# Patient Record
Sex: FEMALE | Race: WHITE | NOT HISPANIC OR LATINO | Employment: FULL TIME | URBAN - METROPOLITAN AREA
[De-identification: names, ages, dates, MRNs, and addresses within clinical notes are randomized per-mention and may not be internally consistent; named-entity substitution may affect disease eponyms.]

---

## 2017-05-01 ENCOUNTER — ALLSCRIPTS OFFICE VISIT (OUTPATIENT)
Dept: OTHER | Facility: OTHER | Age: 52
End: 2017-05-01

## 2017-10-16 ENCOUNTER — ALLSCRIPTS OFFICE VISIT (OUTPATIENT)
Dept: OTHER | Facility: OTHER | Age: 52
End: 2017-10-16

## 2017-10-16 DIAGNOSIS — Z12.31 ENCOUNTER FOR SCREENING MAMMOGRAM FOR MALIGNANT NEOPLASM OF BREAST: ICD-10-CM

## 2017-10-16 DIAGNOSIS — Z01.419 ENCOUNTER FOR GYNECOLOGICAL EXAMINATION WITHOUT ABNORMAL FINDING: ICD-10-CM

## 2017-10-17 NOTE — PROGRESS NOTES
Assessment  1  Encounter for screening mammogram for malignant neoplasm of breast (V76 12)   (Z12 31)   2  Encounter for routine gynecological examination (V72 31) (Z01 419)    Plan  Encounter for routine gynecological examination, Encounter for screening mammogram  for malignant neoplasm of breast    · MAMMO SCREENING BILATERAL W 3D & CAD; Status:Hold For - Scheduling; Requested  for:16Oct2017;    Perform:[facility]; Due:01Mih9176; Ordered;For:Encounter for routine gynecological examination, Encounter for screening mammogram for malignant neoplasm of breast; Ordered By:Saturnino Esparza Hams;   · Follow-up visit in 1 year Evaluation and Treatment  Follow-up  Status: Hold For -  Scheduling  Requested for: 75ZFF2902   Ordered; For: Encounter for routine gynecological examination, Encounter for screening mammogram for malignant neoplasm of breast; Ordered By: Isac Cazares Performed:  Due: 85OPL6912    Discussion/Summary  health maintenance visit healthy adult female Currently, she eats a healthy diet  cervical cancer screening is current Breast cancer screening: mammogram has been ordered  The patient has the current Goals: Patient is at goal    Patient is able to Self-Care  PATIENT EDUCATION RECORD   Indication for Services: Annual gyn exam    Possible side effects of new medications were reviewed with the patient/guardian today  The treatment plan was reviewed with the patient/guardian  The patient/guardian understands and agrees with the treatment plan      Chief Complaint  1  Visit For: Preventive General Multisystem Exam    History of Present Illness  GYN HM, Adult Female [facility]: The patient is being seen for a health maintenance and gynecology evaluation  The last health maintenance visit was 1 year(s) ago  General Health: The patient's health since the last visit is described as good  She has regular dental visits  -- She denies vision problems  -- She denies hearing loss   Immunizations status: up to date    Lifestyle:  She consumes a diverse and healthy diet  -- She does not have any weight concerns  -- She exercises regularly  -- She does not use tobacco -- She denies alcohol use  -- She denies drug use  Reproductive health: the patient is perimenopausal--   she reports no menstrual problems  Screening: cancer screening reviewed and current  metabolic screening reviewed and current  risk screening reviewed and current  Review of Systems    Constitutional: No fever, no chills, feels well, no tiredness, no recent weight gain or loss  ENT: no ear ache, no loss of hearing, no nosebleeds or nasal discharge, no sore throat or hoarseness  Cardiovascular: no complaints of slow or fast heart rate, no chest pain, no palpitations, no leg claudication or lower extremity edema  Respiratory: no complaints of shortness of breath, no wheezing, no dyspnea on exertion, no orthopnea or PND  Breasts: no complaints of breast pain, breast lump or nipple discharge  Gastrointestinal: no complaints of abdominal pain, no constipation, no nausea or diarrhea, no vomiting, no bloody stools  Genitourinary: no complaints of dysuria, no incontinence, no pelvic pain, no dysmenorrhea, no vaginal discharge or abnormal vaginal bleeding  Musculoskeletal: no complaints of arthralgia, no myalgia, no joint swelling or stiffness, no limb pain or swelling  Integumentary: no complaints of skin rash or lesion, no itching or dry skin, no skin wounds  Neurological: no complaints of headache, no confusion, no numbness or tingling, no dizziness or fainting  Active Problems  1  History of Breast self examination education, encounter for (V65 49) (Z71 89)   2  Dense breast tissue (793 82) (R92 2)   3  Dizziness (780 4) (R42)   4  Encounter for PPD test (V74 1) (Z11 1)   5  Excessive cerumen in left ear canal (380 4) (H61 22)   6  History of self breast exam   7  Impacted cerumen of left ear (380 4) (H61 22)   8   Lumbago (724 2) (M54 5)   9  Paronychia, finger (681 02) (L03 019)   10  Skin lesion of face (709 9) (L98 9)   11  Toenail deformity (703 9) (L60 8)    Past Medical History   · History of Acute upper respiratory infection (465 9) (J06 9)   · History of Breast self examination education, encounter for (V65 49) (Z71 89)   · History of Colon cancer screening (V76 51) (Z12 11)   · History of Contusion of skin with intact surface (924 9) (T14 8XXA)   · History of Encounter for screening for malignant neoplasm of cervix (V76 2) (Z12 4)   · History of Encounter for screening for malignant neoplasm of cervix (V76 2) (Z12 4)   · History of  2 (V22 2) (Z33 1)   · History of influenza vaccination (V49 89) (Z92 29)   · History of self breast exam   · History of Pain in right foot (729 5) (M79 671)   · History of Screening for HPV (human papillomavirus) (V73 81) (Z11 51)   · History of Sore throat (462) (J02 9)    Surgical History   · History of Bx Breast Percutan Needle Core Use Imag Guide (Stereotactic)   · History of Hernia Repair    Family History  Mother    · Family history of Breast Cancer (V16 3)  Father    · Family history of Prostate Cancer (V16 42)    Social History   · Denied: History of Alcohol Use (History)   · Birth Control Method - Partner Had Vasectomy   · Denied: History of Caffeine Use   · Denied: History of Drug Use   · Marital History - Currently    · Never A Smoker   · Sikhism Affiliation [employer]   · Two children   · Uses Safety Equipment - Seatbelts    Current Meds   1  Daily Multiple Vitamins Oral Tablet; Therapy: (Recorded:70Egp5923) to Recorded   2  Fish Oil CAPS; Therapy: (Recorded:00Hdf2604) to Recorded    Allergies  1  No Known Drug Allergies  2   Adhesive Tape    Vitals   Recorded: 56RCI3293 06:93FG   Systolic 918, LUE, Sitting   Diastolic 62, LUE, Sitting   Height 5 ft 5 in   Weight 121 lb    BMI Calculated 20 14   BSA Calculated 1 6   LMP [id number]     Physical Exam    Constitutional General appearance: No acute distress, well appearing and well nourished  Neck   Neck: Normal, supple, trachea midline, no masses  Thyroid: Normal, no thyromegaly  Genitourinary   External genitalia: Normal and no lesions appreciated  Vagina: Normal, no lesions or dryness appreciated  Urethra: Normal     Urethral meatus: Normal     Bladder: Normal, soft, non-tender and no prolapse or masses appreciated  Cervix: Normal, no palpable masses  Examination of the cervix revealed normal findings  A Pap smear was not performed  Bimanual exam findings include a patent cervical os  Uterus: Normal, non-tender, not enlarged, and no palpable masses  Adnexa/parametria: Normal, non-tender and no fullness or masses appreciated  Anus, perineum, and rectum: Normal sphincter tone, no masses, and no prolapse  Chest   Breasts: Normal and no dimpling or skin changes noted  Abdomen   Abdomen: Normal, non-tender, and no organomegaly noted  Liver and spleen: No hepatomegaly or splenomegaly  Examination for hernias: No hernias appreciated  Lymphatic   Palpation of lymph nodes in neck, axillae, groin and/or other locations: No lymphadenopathy or masses noted  Skin   Skin and subcutaneous tissue: Normal skin turgor and no rashes  Palpation of skin and subcutaneous tissue: Normal     Psychiatric   Orientation to person, place, and time: Normal     Mood and affect: Normal        Signatures   Electronically signed by :  Mercy Snow MD; Oct 16 2017  2:15PM EST                       (Author)

## 2017-12-05 ENCOUNTER — HOSPITAL ENCOUNTER (OUTPATIENT)
Dept: MAMMOGRAPHY | Facility: MEDICAL CENTER | Age: 52
Discharge: HOME | End: 2017-12-05
Payer: COMMERCIAL

## 2017-12-05 DIAGNOSIS — Z12.31 ENCOUNTER FOR SCREENING MAMMOGRAM FOR MALIGNANT NEOPLASM OF BREAST: ICD-10-CM

## 2017-12-05 DIAGNOSIS — Z01.419 ENCOUNTER FOR GYNECOLOGICAL EXAMINATION WITHOUT ABNORMAL FINDING: ICD-10-CM

## 2017-12-05 PROCEDURE — G0202 SCR MAMMO BI INCL CAD: HCPCS

## 2017-12-05 PROCEDURE — 77063 BREAST TOMOSYNTHESIS BI: CPT

## 2017-12-17 ENCOUNTER — ALLSCRIPTS OFFICE VISIT (OUTPATIENT)
Dept: OTHER | Facility: OTHER | Age: 52
End: 2017-12-17

## 2017-12-19 NOTE — PROGRESS NOTES
Assessment  1  Blepharitis (373 00) (H01 009)    Plan  Blepharitis    · Doxycycline Hyclate 100 MG Oral Tablet; Take 1 tablet twice daily   · Mometasone Furoate 0 1 % External Cream; Apply sparingly to upper eyelid oncea day  Colon cancer screening    · *1 -  [facility] Physician Referral  *  Status: Active -Retrospective By Protocol Authorization  Requested for: [id number]  Care Summary provided  : Yes  PMH: Colon cancer screening    · COLONOSCOPY; Status:Active - Retrospective By Protocol Authorization; Requestedfor:15Xap1930;     Discussion/Summary    Patient is a 73-year-old female with erythema and swelling of her right upper eyelid  I prescribed a topical cortisone cream and reminded her to use it very sparingly  I also prescribed an antibiotic for 7 days in case there is a stye forming  I also advised her not to use eye makeup for the next 5 days  Possible side effects of new medications were reviewed with the patient/guardian today  The treatment plan was reviewed with the patient/guardian  The patient/guardian understands and agrees with the treatment plan      Chief Complaint  Pt presents with c/o of swollen R eye lid x 3 days  Pt states she feels discomfort and itchy  Pt has applied ice pack and Benadryl ointment with some relief at that moment  Pt is due for Colonoscopy, order given to pt  Pt is UTD with Pap Smear and Mammogram       History of Present Illness  HPI: Patient is here with swelling of right upper eyelid  She has had this about 3 days  It has not changed  No pus  No cold symptoms  Review of Systems   Constitutional: no fever-- and-- no chills  ENT: no ear ache, no loss of hearing, no nosebleeds or nasal discharge, no sore throat or hoarseness  Respiratory: no complaints of shortness of breath, no wheezing, no dyspnea on exertion, no orthopnea or PND  Active Problems  1  History of Breast self examination education, encounter for (V60 72) (R72 96)   2  Dizziness (780 4) (R42)   3  Encounter for routine gynecological examination (V72 31) (Z01 419)   4  Encounter for screening mammogram for malignant neoplasm of breast (V76 12) (Z12 31)   5  Excessive cerumen in left ear canal (380 4) (H61 22)   6  History of self breast exam   7  Lumbago (724 2) (M54 5)    Past Medical History  1  History of Acute upper respiratory infection (465 9) (J06 9)   2  History of Breast self examination education, encounter for (V65 49) (Z71 89)   3  History of Colon cancer screening (V76 51) (Z12 11)   4  History of Contusion of skin with intact surface (924 9) (T14 8XXA)   5  History of Dense breast tissue (793 82) (R92 2)   6  History of Encounter for PPD test (V74 1) (Z11 1)   7  History of Encounter for screening for malignant neoplasm of cervix (V76 2) (Z12 4)   8  History of Encounter for screening for malignant neoplasm of cervix (V76 2) (Z12 4)   9  History of  2 (V22 2) (Z33 1)   10  History of influenza vaccination (V49 89) (Z92 29)   11  History of paronychia of finger (V13 3) (Z87 2)   12  History of self breast exam   13  History of Impacted cerumen of left ear (380 4) (H61 22)   14  History of Pain in right foot (729 5) (M79 671)   15  History of Screening for HPV (human papillomavirus) (V73 81) (Z11 51)   16  History of Skin lesion of face (709 9) (L98 9)   17  History of Sore throat (462) (J02 9)   18  History of Toenail deformity (703 9) (L60 8)  Active Problems And Past Medical History Reviewed: The active problems and past medical history were reviewed and updated today  Family History  Mother    1  Family history of Breast Cancer (V16 3)  Father    2   Family history of Prostate Cancer (V16 42)    Social History   · Denied: History of Alcohol Use (History)   · Birth Control Method - Partner Had Vasectomy   · Denied: History of Caffeine Use   · Denied: History of Drug Use   · Marital History - Currently    · Never A Smoker   · Christianity Affiliation [employer]   · Two children   · Uses Safety Equipment - Seatbelts  The social history was reviewed and updated today  The social history was reviewed and is unchanged  Surgical History  1  History of Bx Breast Percutan Needle Core Use Imag Guide (Stereotactic)   2  History of Hernia Repair    Current Meds   1  Cyclobenzaprine HCl - 5 MG Oral Tablet; TAKE 1 TABLET AT BEDTIME AS NEEDED; Therapy: 46WZN1583 to (Evaluate:12Jan2018)  Requested for: 24Hpa1806; Last Rx:[id number] Ordered   2  Daily Multiple Vitamins Oral Tablet; Therapy: (Recorded:16Oct2017) to Recorded   3  Fish Oil CAPS; Therapy: ([employer]) to Recorded   4  Meloxicam 15 MG Oral Tablet; take 1 tablet daily as needed; Therapy: 16QCP5121 to (Evaluate:12Jan2018)  Requested for: 48Gbf3816; Last Rx:82Yjm1433 Ordered    The medication list was reviewed and updated today  Allergies  1  No Known Drug Allergies  2  Adhesive Tape    Vitals   Recorded: [id number] 10:38AM   Temperature 97 5 F, Tympanic   Heart Rate 69   Pulse Quality Normal   Respiration Quality Normal   Respiration 18   Systolic 149, LUE, Sitting   Diastolic 70, LUE, Sitting   BP CUFF SIZE Large   Height 5 ft 5 in   Weight 117 lb 6 oz   BMI Calculated 19 53   BSA Calculated 1 58   O2 Saturation 97, RA   LMP 90NMM0212   Pain Scale discomfort     Physical Exam   Constitutional  General appearance: No acute distress, well appearing and well nourished  Eyes  Conjunctiva and lids: Abnormal  -- Right upper lid with erythema and also a small white round area  Pupils and irises: Equal, round and reactive to light  Ears, Nose, Mouth, and Throat  External inspection of ears and nose: Normal    Otoscopic examination: Tympanic membranes translucent with normal light reflex  Canals patent without erythema  Oropharynx: Normal with no erythema, edema, exudate or lesions           Signatures   Electronically signed by : Rahel Carl DO; Dec 18 2017  8:02AM EST (Author)

## 2018-01-13 VITALS
HEART RATE: 63 BPM | DIASTOLIC BLOOD PRESSURE: 68 MMHG | WEIGHT: 120 LBS | TEMPERATURE: 96.4 F | OXYGEN SATURATION: 98 % | SYSTOLIC BLOOD PRESSURE: 104 MMHG | BODY MASS INDEX: 19.29 KG/M2 | HEIGHT: 66 IN | RESPIRATION RATE: 16 BRPM

## 2018-01-15 VITALS
DIASTOLIC BLOOD PRESSURE: 62 MMHG | SYSTOLIC BLOOD PRESSURE: 106 MMHG | BODY MASS INDEX: 20.16 KG/M2 | HEIGHT: 65 IN | WEIGHT: 121 LBS

## 2018-01-15 NOTE — RESULT NOTES
Verified Results  (Q) CULTURE, FUNGUS, W/SMEAR HAIR, SKIN, NAIL 21Cvj2158 12:00AM [facility]     Test Name Result Flag Reference   CULTURE, FUNGUS, W/SMEAR$HAIR, SKIN, NAIL      CULTURE,FUNGUS,SKIN,HAIR, NAIL W/DIRECT FLUOR/KOH         MICRO NUMBER:      [id number]    TEST STATUS:       FINAL    SPECIMEN SOURCE:   NOT GIVEN    SPECIMEN QUALITY:  ADEQUATE    SMEAR:             No fungal elements seen      RESULT:            No fungus isolated

## 2018-01-16 NOTE — PROGRESS NOTES
Assessment    1  Encounter for preventive health examination (V70 0) (Z00 00)   2  Skin lesion of face (709 9) (L98 9)   3  Toenail deformity (703 9) (L60 8)   4  Impacted cerumen of left ear (380 4) (H61 22)   5  Encounter for PPD test (V74 1) (Z11 1)    Plan  Encounter for PPD test    · PPD; inject 0 1mL intradermal; To Be Done: [id number]  Toenail deformity    · (Q) CULTURE, FUNGUS, W/SMEAR HAIR, SKIN, NAIL; Status:Active; Requested  for:[id number];    · Dermatology Referral Other Physician Referral  Consult; [facility]  [phone] Lisa Iyer    [phone]  Status: Hold For - Scheduling  Requested for: [id number]  are Referring to a non- Preferred Provider : Scheduling access issues  Care Summary provided  : Yes    Discussion/Summary  health maintenance visit Currently, she eats a healthy diet and has an adequate exercise regimen  cervical cancer screening is current Pt follows with Dr Carlos Clancy, has appt Oct 2016 for yearly Breast cancer screening: mammogram is current and last done nov 2015  Colorectal cancer screening: the risks and benefits of colorectal cancer screening were discussed and recommended colonoscopy screening - pt states she has discussed this with her GYN and would consider possibly at 55, refuses my recommendations today  Screening lab work includes discussed recommendations for screening BW - pt refuses today  The refuses flu vaccine today  PPD administered as needed for employment  will return within 48-72 hours for PPD read  She was advised to be evaluated by an ophthalmologist and a dentist  Advice and education were given regarding nutrition, aerobic exercise, reproductive health and cardiovascular risk reduction  Patient discussion: discussed with the patient  1) cerumen impaction on left - advised debrox drops OTC x a few days   will schedule ear lavage with TZ sunday when she returns for PPD read - ok to schedule today upon check out - TZ aware    2) skin lesion face - based on location, new lesion and irregularity, advised consultation with derm  pt given contact info for [facility] and will call for appt  3) toenail deformity: left great toenail present for several months  appears to be fungal, no known nail trauma  sample taken to confirm diagnosis and will discuss treatment if necessary  I did have discussion with pt about treatment as she states she has used OTC antifungal for toenails x 1 month and didn't help  I explained even prescription medications may take several months to work and are not even guaranteed  will call pt with results and discuss treatment options if necessary  Chief Complaint  pt here today for a work physical and needs forms filled      History of Present Illness  HM, Adult Female: The patient is being seen for a health maintenance evaluation  General Health: The patient's health since the last visit is described as good   Denies changes to health, no recent hospitalizations  She has regular dental visits  She denies vision problems  Vision care includes wearing glasses, wearing soft contact lenses and last eye exam: 2016  She denies hearing loss  Immunizations status:  pt refuses flu vaccine  Lifestyle:  She consumes a diverse and healthy diet  She does not have any weight concerns  She exercises regularly  She exercises 3-4 times per week  Exercise includes walking, stretching/yoga/pilates and rides horses  She does not use tobacco  She denies alcohol use  She denies drug use  Reproductive health:  she reports normal menses  Menstrual history: LMP: the last menstrual period was 9/20/16  Recent menstrual periods: bleeding has been normal  The cycles have been regular  The duration of her recent periods has been regular  she uses no contraception  For contraception, she has a partner with a vasectomy  she is sexually active  She is monogamous with a male partner and with   pregnancy history: G 2P 22, 2  Screening: Cervical cancer screening includes a pap smear performed 9/2015  Breast cancer screening includes a mammogram performed nov 2015  Colorectal cancer screening includes no previous colonoscopy  Additional History:  - pt states left great toenail for past few months thickened, darker, scaling and breaking  has tried 1 month of OTC topical antifungal and not helping      - darkened skin lesion left cheek noticed over summer, lightening with topical aloe  Review of Systems    Constitutional: No fever, no chills, feels well, no tiredness, no recent weight gain or weight loss  Eyes: No complaints of eye pain, no red eyes, no eyesight problems, no discharge, no dry eyes, no itching of eyes  ENT: no complaints of earache, no loss of hearing, no nose bleeds, no nasal discharge, no sore throat, no hoarseness  Cardiovascular: No complaints of slow heart rate, no fast heart rate, no chest pain, no palpitations, no leg claudication, no lower extremity edema  Respiratory: No complaints of shortness of breath, no wheezing, no cough, no SOB on exertion, no orthopnea, no PND  Gastrointestinal: No complaints of abdominal pain, no constipation, no nausea or vomiting, no diarrhea, no bloody stools  Genitourinary: No complaints of dysuria, no incontinence, no pelvic pain, no dysmenorrhea, no vaginal discharge or bleeding  Musculoskeletal: No complaints of arthralgias, no myalgias, no joint swelling or stiffness, no limb pain or swelling  Integumentary: as noted in HPI  Neurological: No complaints of headache, no confusion, no convulsions, no numbness, no dizziness or fainting, no tingling, no limb weakness, no difficulty walking  Psychiatric: Not suicidal, no sleep disturbance, no anxiety or depression, no change in personality, no emotional problems  Endocrine: No complaints of proptosis, no hot flashes, no muscle weakness, no deepening of the voice, no feelings of weakness  Hematologic/Lymphatic: No complaints of swollen glands, no swollen glands in the neck, does not bleed easily, does not bruise easily  Active Problems    1  History of Breast self examination education, encounter for (V65 49) (Z71 89)   2  Encounter for screening for malignant neoplasm of cervix (V76 2) (Z12 4)   3  Encounter for screening for malignant neoplasm of cervix (V76 2) (Z12 4)   4  Encounter for screening mammogram for malignant neoplasm of breast (V76 12)   (Z12 31)   5  History of self breast exam   6  Paronychia, finger (681 02) (L03 019)   7  Screening for HPV (human papillomavirus) (V73 81) (Z11 51)    Past Medical History    · History of Acute upper respiratory infection (465 9) (J06 9)   · History of Breast self examination education, encounter for (V65 49) (Z71 89)   · History of Contusion of skin with intact surface (924 9) (T14 8)   · History of Encounter for routine gynecological examination (V72 31) (Z01 419)   · History of  2 (V22 2) (Z33 1)   · History of Pain in right foot (729 5) (M79 671)   · History of Sore throat (462) (J02 9)    Surgical History    · History of Bx Breast Percutan Needle Core Use Imag Guide (Stereotactic)   · History of Hernia Repair    Family History  Mother    · Family history of Breast Cancer (V16 3)  Father    · Family history of Prostate Cancer (V16 42)    Social History    · Denied: History of Alcohol Use (History)   · Birth Control Method - Partner Had Vasectomy   · Denied: History of Caffeine Use   · Denied: History of Drug Use   · Marital History - Currently    · Never A Smoker   · [employer]ism Affiliation [employer]   · Two children   · Uses Safety Equipment - Seatbelts    Current Meds   1  Fish Oil CAPS; Therapy: (Recorded:83Msn5266) to Recorded    Allergies    1  No Known Drug Allergies    2   Adhesive Tape    Vitals   Recorded: 59FEN8209 74:46FF   Systolic 98   Diastolic 52   Heart Rate 64   Respiration 16   Temperature 98 6 F   O2 Saturation 98   Height 5 ft 6 in   Weight 120 lb    BMI Calculated 19 37   BSA Calculated 1 62     Physical Exam    Constitutional   General appearance: No acute distress, well appearing and well nourished  appears healthy, within normal limits of ideal weight and appearance reflects stated age  vitals reviewed  Eyes   Conjunctiva and lids: No swelling, erythema or discharge  Pupils and irises: Equal, round, reactive to light  EOMI, PERRLA B/L  Ears, Nose, Mouth, and Throat   External inspection of ears and nose: Normal     Otoscopic examination: Abnormal   right TM normal  left TM not visible secondary to cerumen impaction  Hearing: Normal   grossly normal B/L at 10ft  Nasal mucosa, septum, and turbinates: Normal without edema or erythema  Lips, teeth, and gums: Normal, good dentition  Oropharynx: Normal with no erythema, edema, exudate or lesions  Neck   Thyroid: Normal, no thyromegaly  Pulmonary   Respiratory effort: No increased work of breathing or signs of respiratory distress  Auscultation of lungs: Clear to auscultation  Cardiovascular   Auscultation of heart: Normal rate and rhythm, normal S1 and S2, no murmurs  Carotid pulses: 2+ bilaterally  right 2+, no bruit heard over the right carotid, left 2+ and no bruit heard over the left carotid  Pedal pulses: 2+ bilaterally  Examination of extremities for edema and/or varicosities: Normal     Abdomen   Abdomen: Non-tender, no masses  The abdomen was flat  Bowel sounds were normal  The abdomen was soft and nontender  Lymphatic   Palpation of lymph nodes in neck: No lymphadenopathy  Palpation of lymph nodes in groin: No lymphadenopathy  Musculoskeletal   Gait and station: Normal     Digits and nails: Normal without clubbing or cyanosis      Joints, bones, and muscles: Normal     Range of motion: Normal     Stability: Normal     Muscle strength/tone: Normal     Skin   Skin and subcutaneous tissue: Abnormal   left great toenail appears darker in color/yellow, slightly thickened along lateral corner, chipping and appears to be peeling in layers  sample taken for fungal culture  Examination of the skin for lesions: Abnormal   slight hyperpigmented flat but irregularly bordered skin lesion left medial cheek, flat but slightly scaly/dry texture  Neurologic   Cranial nerves: Cranial nerves II-XII intact  Reflexes: 2+ and symmetric  Deep tendon reflexes: 1+ right brachioradialis, 1+ left brachioradialis, 1+ right patella and 1+ left patella  Coordination: Normal finger to nose and heel to shin      Psychiatric   Judgment and insight: Normal     Orientation to person, place, and time: Normal     Mood and affect: Normal        Future Appointments    Date/Time Provider Specialty Site   10/04/2016 01:30 PM Simon Gautam MD Obstetrics/Gynecology [facility]   09/25/2016 09:00 AM Jono Downs [facility] [address]   Electronically signed by : Zoey Claros [facility]; Sep 22 2016  3:25PM EST                       (Author)    Electronically signed by : Lasha Weber DO; Sep 22 2016  3:31PM EST                       (Author)

## 2018-01-17 NOTE — PROCEDURES
Chief Complaint  Pt presents for bilateral ear cerumen removal and a PPD reading  PPD is negative at 2mm  Pt states she has PE forms that are in office which requires signature  Current Meds   1  Fish Oil CAPS; Therapy: (Recorded:[id number]) to Recorded    Allergies    1  No Known Drug Allergies    2  Adhesive Tape    Vitals  Signs    Systolic: 98, LUE, Sitting  Diastolic: 62, LUE, Sitting  Heart Rate: 58  Pulse Quality: Norm  Respiration Quality: Norm  Respiration: 16  Temperature: 98 1 F, Tympanic  Pain Scale: 0  LMP: 14-Sep-2016  O2 Saturation: 98  Height: 5 ft 5 98 in  Weight: 119 lb 2 08 oz  BMI Calculated: 19 24  BSA Calculated: 1 61    Procedure    Procedure: cerumen removal    Indication: tympanic membrane(s) could not be visualized and cerumen impaction in the left ear  Prep: hydrogen peroxide was placed in the canal prior to the procedure  Procedure Note: The procedure was performed by the Provider and lasted 10 minute(s)   The procedure required significant time and effort to remove the cerumen  A otoscope was placed in the ear canal(s) to visualize the ear canal debris  The ear was cleaned by using warm water irrigation  The procedure was successful  Post-Procedure:   Patient Status: the patient tolerated the procedure well  Complications: there were no complications  Patient instructions: dry ear precautions and avoid using q-tips  Follow-up as needed  Assessment    1  Excessive cerumen in left ear canal (380 4) (H61 22)    Plan  Excessive cerumen in left ear canal    · Removal Impacted Cerumen Using Irrigation / Lavage one or both ears - POC;  Status:Complete;   Done: 38VYP7730 09:28AM  Toenail deformity    · Dermatology Referral Other Physician Referral  Consult; [facility]  [phone]    [phone]  Status: Active  Requested for: [id number]  are Referring to a non- Preferred Provider : Scheduling access issues  Care Summary provided   Bhavik Fleming Yes    Future Appointments    Date/Time Provider Specialty Site   10/04/2016 01:30 PM Ny Mcfadden MD Obstetrics/Gynecology [facility]     Signatures   Electronically signed by : Kathe Lazo, [facility]; Sep 25 2016 10:26AM EST                       (Author)    Electronically signed by :  Rebekah Nunez DO; Sep 25 2016  1:57PM EST                       (Author)

## 2018-01-22 VITALS
SYSTOLIC BLOOD PRESSURE: 102 MMHG | BODY MASS INDEX: 19.56 KG/M2 | WEIGHT: 117.38 LBS | HEIGHT: 65 IN | HEART RATE: 69 BPM | TEMPERATURE: 97.5 F | OXYGEN SATURATION: 97 % | RESPIRATION RATE: 18 BRPM | DIASTOLIC BLOOD PRESSURE: 70 MMHG

## 2018-06-05 ENCOUNTER — TRANSCRIBE ORDERS (OUTPATIENT)
Dept: FAMILY MEDICINE CLINIC | Facility: CLINIC | Age: 53
End: 2018-06-05

## 2018-06-05 DIAGNOSIS — H01.006 BLEPHARITIS OF BOTH EYES, UNSPECIFIED EYELID, UNSPECIFIED TYPE: Primary | ICD-10-CM

## 2018-06-05 DIAGNOSIS — H01.003 BLEPHARITIS OF BOTH EYES, UNSPECIFIED EYELID, UNSPECIFIED TYPE: Primary | ICD-10-CM

## 2018-10-23 RX ORDER — DOXYCYCLINE HYCLATE 100 MG
1 TABLET ORAL 2 TIMES DAILY
COMMUNITY
Start: 2017-12-17 | End: 2020-12-13

## 2018-10-23 RX ORDER — MOMETASONE FUROATE 1 MG/G
CREAM TOPICAL
COMMUNITY
Start: 2017-12-17 | End: 2019-06-18 | Stop reason: SDUPTHER

## 2018-10-23 RX ORDER — MELOXICAM 15 MG/1
1 TABLET ORAL DAILY PRN
COMMUNITY
Start: 2017-05-01 | End: 2020-12-13

## 2018-10-23 RX ORDER — CYCLOBENZAPRINE HCL 5 MG
1 TABLET ORAL
COMMUNITY
Start: 2017-05-01 | End: 2020-12-13

## 2018-10-25 ENCOUNTER — ANNUAL EXAM (OUTPATIENT)
Dept: OBGYN CLINIC | Facility: CLINIC | Age: 53
End: 2018-10-25
Payer: COMMERCIAL

## 2018-10-25 VITALS
DIASTOLIC BLOOD PRESSURE: 68 MMHG | SYSTOLIC BLOOD PRESSURE: 116 MMHG | WEIGHT: 122 LBS | HEIGHT: 66 IN | BODY MASS INDEX: 19.61 KG/M2

## 2018-10-25 DIAGNOSIS — Z01.419 ENCOUNTER FOR GYNECOLOGICAL EXAMINATION WITHOUT ABNORMAL FINDING: ICD-10-CM

## 2018-10-25 DIAGNOSIS — Z12.4 SCREENING FOR CERVICAL CANCER: Primary | ICD-10-CM

## 2018-10-25 DIAGNOSIS — Z11.51 SCREENING FOR HUMAN PAPILLOMAVIRUS (HPV): ICD-10-CM

## 2018-10-25 DIAGNOSIS — Z12.31 ENCOUNTER FOR SCREENING MAMMOGRAM FOR MALIGNANT NEOPLASM OF BREAST: ICD-10-CM

## 2018-10-25 PROBLEM — H01.001 BLEPHARITIS OF RIGHT UPPER EYELID: Status: ACTIVE | Noted: 2017-12-17

## 2018-10-25 PROBLEM — M54.50 LOW BACK PAIN: Status: ACTIVE | Noted: 2017-05-01

## 2018-10-25 PROBLEM — H01.009 BLEPHARITIS: Status: ACTIVE | Noted: 2017-12-17

## 2018-10-25 PROBLEM — R42 DIZZINESS: Status: ACTIVE | Noted: 2017-06-19

## 2018-10-25 PROCEDURE — 99396 PREV VISIT EST AGE 40-64: CPT | Performed by: OBSTETRICS & GYNECOLOGY

## 2018-10-25 PROCEDURE — 87624 HPV HI-RISK TYP POOLED RSLT: CPT | Performed by: OBSTETRICS & GYNECOLOGY

## 2018-10-25 PROCEDURE — G0145 SCR C/V CYTO,THINLAYER,RESCR: HCPCS | Performed by: OBSTETRICS & GYNECOLOGY

## 2018-10-25 NOTE — PROGRESS NOTES
CC:  Annual exam    HPI: Popeye Aguirre presents for annual gyn exam   She is doing well and offers no complaints or concerns  She does note that her cycles are starting to spread out and become lighter  She was reassured that this is totally normal given her age  Past Medical History:  Past Medical History:   Diagnosis Date    Endometriosis        Past Surgical History:  History reviewed  No pertinent surgical history  Past OB/Gyn History:  Menstrual cycles as discussed under HPI  Denies any history of sexually transmitted infection  No history of abnormal pap smears  Her last pap smear was 2015  ALLERGIES:   Allergies   Allergen Reactions    Adhesive  [Medical Tape]        MEDS:   Current Outpatient Prescriptions:     DAILY MULTIPLE VITAMINS/IRON PO    Omega-3 Fatty Acids (FISH OIL) 645 MG CAPS    cyclobenzaprine (FLEXERIL) 5 mg tablet    doxycycline hyclate (VIBRA-TABS) 100 mg tablet    meloxicam (MOBIC) 15 mg tablet    mometasone (ELOCON) 0 1 % cream    Family History:  History reviewed  No pertinent family history  Social History:  Social History     Social History    Marital status: /Civil Union     Spouse name: N/A    Number of children: N/A    Years of education: N/A     Occupational History    Not on file  Social History Main Topics    Smoking status: Never Smoker    Smokeless tobacco: Never Used    Alcohol use No    Drug use: No    Sexual activity: Yes     Partners: Male     Other Topics Concern    Not on file     Social History Narrative    No narrative on file         Review of Systems:  Gen:   Denies fatigue, chills, nausea, vomiting, fever  Skin: No rashes or discolorations of any concern  RESP: Denies SOB, no cough  CV: Denies chest pain or palpitations  Breasts: Denies masses, pain, skin changes and nipple discharge  GI: Denies abdominal pain, heartburn, nausea, vomiting, changes in bowel habits     : Denies dysuria, frequency, CVA tenderness, incontinence and hematuria  Genitalia: Denies abnormal vaginal discharge, external lesions, rashes, pelvic pain, pressure, abnormal bleeding  Rectal:  Denies pain, bleeding, hemorrhoids,    Physical Exam:  /68 (BP Location: Left arm, Patient Position: Sitting, Cuff Size: Standard)   Ht 5' 6" (1 676 m)   Wt 55 3 kg (122 lb)   LMP 09/26/2018 (Approximate)   Breastfeeding? No   BMI 19 69 kg/m²    Gen: The patient was alert and oriented x3, pleasant well-appearing female in no acute distress  Neck:  Unremarkable, no anterior or posterior lymphadenopathy, no thyromegaly  Breasts: Symmetric  No dominant, discrete, fixed  or suspicious masses are noted  No skin or nipple changes  No palpable axillary nodes  Abd:  Soft, nontender, nondistended, no masses or organomegaly  Back:  No CVA tenderness, no tenderness to palpation along spine  Pelvic  Normal appearing external female genitalia, no visible lesions, no rashes  Vagina is free of discharge, normal vaginal epithelium, no abnormal  lesions, no evidence of prolapse anteriorly or posteriorly  Normal appearing cervix, mobile and nontender  A thin prep pap smear was obtained  Uterus is normal size, mobile and, nontender  No palpable adnexal masses or tenderness  No anoperineal lesions  Rectal:  No masses, tenderness, hemorrhoids, or obvious blood  Skin:  No concerning lesions  Extremeties: No edema      Assessment & Plan:   1  Routine annual exam      RTO one year orPRN  2  Encounter for screening mammogram, referral for mammogram given to patient

## 2018-10-29 LAB
HPV HR 12 DNA CVX QL NAA+PROBE: NEGATIVE
HPV16 DNA CVX QL NAA+PROBE: NEGATIVE
HPV18 DNA CVX QL NAA+PROBE: NEGATIVE

## 2018-10-31 LAB
LAB AP GYN PRIMARY INTERPRETATION: NORMAL
Lab: NORMAL

## 2018-12-13 ENCOUNTER — HOSPITAL ENCOUNTER (OUTPATIENT)
Dept: MAMMOGRAPHY | Facility: MEDICAL CENTER | Age: 53
Discharge: HOME | End: 2018-12-13
Payer: COMMERCIAL

## 2018-12-13 VITALS — WEIGHT: 122 LBS | HEIGHT: 66 IN | BODY MASS INDEX: 19.61 KG/M2

## 2018-12-13 DIAGNOSIS — Z12.31 ENCOUNTER FOR SCREENING MAMMOGRAM FOR MALIGNANT NEOPLASM OF BREAST: ICD-10-CM

## 2018-12-13 PROCEDURE — 77067 SCR MAMMO BI INCL CAD: CPT

## 2018-12-13 PROCEDURE — 77063 BREAST TOMOSYNTHESIS BI: CPT

## 2018-12-26 ENCOUNTER — OFFICE VISIT (OUTPATIENT)
Dept: FAMILY MEDICINE CLINIC | Facility: CLINIC | Age: 53
End: 2018-12-26
Payer: COMMERCIAL

## 2018-12-26 VITALS
WEIGHT: 120.8 LBS | SYSTOLIC BLOOD PRESSURE: 120 MMHG | TEMPERATURE: 98.4 F | HEART RATE: 75 BPM | DIASTOLIC BLOOD PRESSURE: 68 MMHG | OXYGEN SATURATION: 99 % | RESPIRATION RATE: 16 BRPM | BODY MASS INDEX: 19.41 KG/M2 | HEIGHT: 66 IN

## 2018-12-26 DIAGNOSIS — Z01.84 IMMUNITY STATUS TESTING: ICD-10-CM

## 2018-12-26 DIAGNOSIS — Z23 VACCINE FOR DIPHTHERIA-TETANUS: ICD-10-CM

## 2018-12-26 DIAGNOSIS — Z11.1 SCREENING FOR TUBERCULOSIS: ICD-10-CM

## 2018-12-26 DIAGNOSIS — Z02.89 ENCOUNTER FOR PHYSICAL EXAMINATION RELATED TO EMPLOYMENT: Primary | ICD-10-CM

## 2018-12-26 PROCEDURE — 99214 OFFICE O/P EST MOD 30 MIN: CPT | Performed by: NURSE PRACTITIONER

## 2018-12-26 PROCEDURE — 90471 IMMUNIZATION ADMIN: CPT | Performed by: NURSE PRACTITIONER

## 2018-12-26 PROCEDURE — 90715 TDAP VACCINE 7 YRS/> IM: CPT | Performed by: NURSE PRACTITIONER

## 2018-12-26 NOTE — PROGRESS NOTES
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 and needs a physical examination and titers today  She denies any healthcare concerns  Physical assessment is benign as documented below  She does not have a form with her today  She will need to check with her school if a form is required  She will bring form to her PPD appointment  2  Immunity status testing  Labs drawn today, 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   - Measles/Mumps/Rubella Immunity  - Varicella zoster antibody, IgM      3  Screening for tuberculosis  Order placed today, but patient will have to return for this testing  She is leaving for out of town tomorrow and would not be able to return in the 48-72 hour time frame  She will schedule a nurse visit for this when she returns in 1 week  - TB Skin Test    4  Vaccine for diphtheria-tetanus  Vaccine administered today  - TDAP VACCINE GREATER THAN OR EQUAL TO 6YO IM        SUBJECTIVE       Patient ID: Suleman Prado is a 47 y o  female  Chief Complaint   Patient presents with   [facility]    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 and needs titers drawn and a PPD  She has no healthcare concerns today          The following portions of the patient's history were reviewed and updated as appropriate: allergies, current medications, past medical history and problem list     REVIEW OF SYSTEMS  Review of Systems   Constitutional: Negative  HENT: Negative  Respiratory: Negative  Cardiovascular: Negative  Genitourinary: Negative  Neurological: Negative  Psychiatric/Behavioral: Negative          OBJECTIVE      VITAL SIGNS  BP 120/68 (BP Location: Left arm, Patient Position: Sitting, Cuff Size: Standard)   Pulse 75   Temp 98 4 °F (36 9 °C) (Tympanic)   Resp 16   Ht 5' 6" (1 676 m)   Wt 54 8 kg (120 lb 12 8 oz)   LMP 12/05/2018 (Exact Date)   SpO2 99%   BMI 19 50 kg/m²       PHYSICAL EXAMINATION   Physical Exam   Constitutional: She is oriented to person, place, and time  She appears well-developed and well-nourished  HENT:   Head: Normocephalic  Right Ear: Hearing, tympanic membrane, external ear and ear canal normal  No middle ear effusion  Left Ear: Hearing, tympanic membrane, external ear and ear canal normal   No middle ear effusion  Nose: No mucosal edema  Right sinus exhibits no maxillary sinus tenderness and no frontal sinus tenderness  Left sinus exhibits no maxillary sinus tenderness and no frontal sinus tenderness  Mouth/Throat: Oropharynx is clear and moist and mucous membranes are normal    Eyes: Right eye exhibits no discharge  Left eye exhibits no discharge  Cardiovascular: Normal rate, regular rhythm, S1 normal, S2 normal and normal heart sounds  No murmur heard  Pulmonary/Chest: Effort normal and breath sounds normal  No respiratory distress  Abdominal: Soft  Normal appearance and bowel sounds are normal    Musculoskeletal: Normal range of motion  Lymphadenopathy:        Head (right side): No submental and no submandibular adenopathy present  Head (left side): No submental and no submandibular adenopathy present  She has no cervical adenopathy  Neurological: She is alert and oriented to person, place, and time  Skin: Skin is warm, dry and intact  Psychiatric: She has a normal mood and affect  Her speech is normal and behavior is normal  Judgment and thought content normal  Cognition and memory are normal    Nursing note and vitals reviewed

## 2018-12-28 LAB
MEV IGG SER IA-ACNC: <25 AU/ML
MUV IGG SER IA-ACNC: <9 AU/ML
RUBV IGG SERPL IA-ACNC: 18 INDEX
VZV IGM SER IA-ACNC: <=0.9

## 2018-12-31 ENCOUNTER — CLINICAL SUPPORT (OUTPATIENT)
Dept: FAMILY MEDICINE CLINIC | Facility: CLINIC | Age: 53
End: 2018-12-31
Payer: COMMERCIAL

## 2018-12-31 DIAGNOSIS — Z11.1 SCREENING EXAMINATION FOR PULMONARY TUBERCULOSIS: ICD-10-CM

## 2018-12-31 DIAGNOSIS — Z23 NEED FOR HEPATITIS VACCINATION: Primary | ICD-10-CM

## 2018-12-31 PROCEDURE — 86580 TB INTRADERMAL TEST: CPT | Performed by: FAMILY MEDICINE

## 2018-12-31 PROCEDURE — 90746 HEPB VACCINE 3 DOSE ADULT IM: CPT | Performed by: FAMILY MEDICINE

## 2018-12-31 PROCEDURE — 90471 IMMUNIZATION ADMIN: CPT | Performed by: FAMILY MEDICINE

## 2019-01-02 ENCOUNTER — CLINICAL SUPPORT (OUTPATIENT)
Dept: FAMILY MEDICINE CLINIC | Facility: CLINIC | Age: 54
End: 2019-01-02

## 2019-01-02 DIAGNOSIS — Z11.1 SCREENING FOR TUBERCULOSIS: ICD-10-CM

## 2019-01-02 LAB
INDURATION: 0 MM
TB SKIN TEST: NEGATIVE

## 2019-01-02 PROCEDURE — RECHECK: Performed by: FAMILY MEDICINE

## 2019-05-19 ENCOUNTER — OFFICE VISIT (OUTPATIENT)
Dept: URGENT CARE | Facility: CLINIC | Age: 54
End: 2019-05-19
Payer: COMMERCIAL

## 2019-05-19 VITALS
HEIGHT: 66 IN | BODY MASS INDEX: 19.22 KG/M2 | RESPIRATION RATE: 16 BRPM | TEMPERATURE: 97.4 F | OXYGEN SATURATION: 97 % | DIASTOLIC BLOOD PRESSURE: 61 MMHG | SYSTOLIC BLOOD PRESSURE: 103 MMHG | WEIGHT: 119.6 LBS | HEART RATE: 72 BPM

## 2019-05-19 DIAGNOSIS — H57.11 EYE PAIN, RIGHT: Primary | ICD-10-CM

## 2019-05-19 PROCEDURE — G0382 LEV 3 HOSP TYPE B ED VISIT: HCPCS | Performed by: NURSE PRACTITIONER

## 2019-05-19 RX ORDER — TETRACAINE HYDROCHLORIDE 5 MG/ML
1 SOLUTION OPHTHALMIC ONCE
Status: COMPLETED | OUTPATIENT
Start: 2019-05-19 | End: 2019-05-19

## 2019-05-19 RX ADMIN — TETRACAINE HYDROCHLORIDE 1 DROP: 5 SOLUTION OPHTHALMIC at 10:10

## 2019-06-18 DIAGNOSIS — L30.9 DERMATITIS: Primary | ICD-10-CM

## 2019-06-18 RX ORDER — MOMETASONE FUROATE 1 MG/G
CREAM TOPICAL
Qty: 15 G | Refills: 0 | Status: SHIPPED | OUTPATIENT
Start: 2019-06-18 | End: 2020-12-13

## 2019-10-21 ENCOUNTER — TELEPHONE (OUTPATIENT)
Dept: OBGYN CLINIC | Facility: CLINIC | Age: 54
End: 2019-10-21

## 2019-11-05 ENCOUNTER — ANNUAL EXAM (OUTPATIENT)
Dept: OBGYN CLINIC | Facility: CLINIC | Age: 54
End: 2019-11-05
Payer: COMMERCIAL

## 2019-11-05 VITALS
BODY MASS INDEX: 19.61 KG/M2 | SYSTOLIC BLOOD PRESSURE: 102 MMHG | DIASTOLIC BLOOD PRESSURE: 70 MMHG | HEIGHT: 66 IN | WEIGHT: 122 LBS

## 2019-11-05 DIAGNOSIS — Z12.31 ENCOUNTER FOR SCREENING MAMMOGRAM FOR MALIGNANT NEOPLASM OF BREAST: ICD-10-CM

## 2019-11-05 DIAGNOSIS — Z01.419 ENCOUNTER FOR GYNECOLOGICAL EXAMINATION WITHOUT ABNORMAL FINDING: Primary | ICD-10-CM

## 2019-11-05 PROCEDURE — 99396 PREV VISIT EST AGE 40-64: CPT | Performed by: OBSTETRICS & GYNECOLOGY

## 2019-11-05 NOTE — PROGRESS NOTES
CC:  Annual exam    HPI: Karlee Johns presents for routine yearly visit  Jared Lovell is doing well and offers no complaints or concerns  She only had 2 episodes of spotting throughout the past year  She has occasional heat flashes and night sweats but no other complaints period    Past Medical History:  Past Medical History:   Diagnosis Date    Endometriosis     Varicella        Past Surgical History:  Past Surgical History:   Procedure Laterality Date    HERNIA REPAIR      TONSILLECTOMY         Past OB/Gyn History:  Menstrual cycles as discussed under HPI  Denies any history of sexually transmitted infection  No history of abnormal pap smears   Her last pap smear was last year and was normal     ALLERGIES:   Allergies   Allergen Reactions    Adhesive  [Medical Tape]        MEDS:   Current Outpatient Medications:     DAILY MULTIPLE VITAMINS/IRON PO    Omega-3 Fatty Acids (FISH OIL) 645 MG CAPS    cyclobenzaprine (FLEXERIL) 5 mg tablet    doxycycline hyclate (VIBRA-TABS) 100 mg tablet    meloxicam (MOBIC) 15 mg tablet    mometasone (ELOCON) 0 1 % cream    Family History:  Family History   Problem Relation Age of Onset    Breast cancer Mother     Prostate cancer Father        Social History:  Social History     Socioeconomic History    Marital status: /Civil Union     Spouse name: Not on file    Number of children: Not on file    Years of education: Not on file    Highest education level: Not on file   Occupational History    Not on file   Social Needs    Financial resource strain: Not on file    Food insecurity:     Worry: Not on file     Inability: Not on file    Transportation needs:     Medical: Not on file     Non-medical: Not on file   Tobacco Use    Smoking status: Never Smoker    Smokeless tobacco: Never Used   Substance and Sexual Activity    Alcohol use: No    Drug use: No    Sexual activity: Yes     Partners: Male   Lifestyle    Physical activity:     Days per week: Not on file     Minutes per session: Not on file    Stress: Not on file   Relationships    Social connections:     Talks on phone: Not on file     Gets together: Not on file     Attends Taoism service: Not on file     Active member of club or organization: Not on file     Attends meetings of clubs or organizations: Not on file     Relationship status: Not on file    Intimate partner violence:     Fear of current or ex partner: Not on file     Emotionally abused: Not on file     Physically abused: Not on file     Forced sexual activity: Not on file   Other Topics Concern    Not on file   Social History Narrative    Not on file         Review of Systems:  Gen:   Denies fatigue, chills, nausea, vomiting, fever  Skin: No rashes or discolorations of any concern  RESP: Denies SOB, no cough  CV: Denies chest pain or palpitations  Breasts: Denies masses, pain, skin changes and nipple discharge  GI: Denies abdominal pain, heartburn, nausea, vomiting, changes in bowel habits  : Denies dysuria, frequency, CVA tenderness, incontinence and hematuria  Genitalia: Denies abnormal vaginal discharge, external lesions, rashes, pelvic pain, pressure, abnormal bleeding  Rectal:  Denies pain, bleeding, hemorrhoids,    Physical Exam:  /70 (BP Location: Left arm, Patient Position: Sitting, Cuff Size: Standard)   Ht 5' 6" (1 676 m)   Wt 55 3 kg (122 lb)   BMI 19 69 kg/m²    Gen: The patient was alert and oriented x3, pleasant well-appearing female in no acute distress  Neck:  Unremarkable, no lymphadenopathy, no thyromegaly, or tenderness  CV:  RRR, no murmurs  Resp:  Clear to auscultation bilaterally, no wheezing  Breasts: Symmetric  No dominant, discrete, fixed  or suspicious masses are noted  No skin or nipple changes  No palpable axillary nodes  No supraclavicular adenopathy    Abd:  Soft, nontender, nondistended, no masses or organomegaly  Back:  No CVA tenderness, no tenderness to palpation along spine  Pelvic:  Normal appearing external female genitalia, no visible lesions, no rashes  Vagina is free of discharge, normal vaginal epithelium, no abnormal  lesions, no evidence of prolapse anteriorly or posteriorly  Normal appearing cervix, mobile and nontender  A thin prep pap smear was not obtained  Uterus is normal size, mobile and, nontender  No palpable adnexal masses or tenderness  No anoperineal lesions  Rectal:  No masses, tenderness, hemorrhoids, or obvious blood  Skin:  No concerning lesions  Extremeties: No edema      Assessment & Plan:   1  Routine annual exam      RTO one year orPRN  2  Encounter for screening mammogram, referral for mammogram given to patient  3   Menopausal status  Patient given information on calcium and vitamin-D replacement goals

## 2019-11-19 DIAGNOSIS — M54.32 SCIATICA OF LEFT SIDE: Primary | ICD-10-CM

## 2019-11-19 RX ORDER — CYCLOBENZAPRINE HCL 5 MG
5 TABLET ORAL 3 TIMES DAILY PRN
Qty: 20 TABLET | Refills: 0 | Status: SHIPPED | OUTPATIENT
Start: 2019-11-19 | End: 2020-12-13

## 2020-01-16 ENCOUNTER — HOSPITAL ENCOUNTER (OUTPATIENT)
Dept: MAMMOGRAPHY | Facility: MEDICAL CENTER | Age: 55
Discharge: HOME | End: 2020-01-16
Payer: COMMERCIAL

## 2020-01-16 VITALS — BODY MASS INDEX: 19.61 KG/M2 | WEIGHT: 122 LBS | HEIGHT: 66 IN

## 2020-01-16 DIAGNOSIS — Z12.31 ENCOUNTER FOR SCREENING MAMMOGRAM FOR MALIGNANT NEOPLASM OF BREAST: ICD-10-CM

## 2020-01-16 PROCEDURE — 77067 SCR MAMMO BI INCL CAD: CPT

## 2020-01-16 PROCEDURE — 77063 BREAST TOMOSYNTHESIS BI: CPT

## 2020-01-21 ENCOUNTER — TELEPHONE (OUTPATIENT)
Dept: OBGYN CLINIC | Facility: CLINIC | Age: 55
End: 2020-01-21

## 2020-01-21 DIAGNOSIS — Z80.3 FAMILY HISTORY OF BREAST CANCER IN MOTHER: Primary | ICD-10-CM

## 2020-01-21 NOTE — TELEPHONE ENCOUNTER
----- Message from Ember Carpenter MD sent at 1/16/2020  4:37 PM EST -----   Patient is identified as being at risk for breast cancer        Ask if she would like a consult to Surgical Oncology

## 2020-01-21 NOTE — TELEPHONE ENCOUNTER
Patient informed of Dr Hilda Eason offer to go to Surgical Oncology for a consult regarding her mammogram and breast history  Referral entered

## 2020-02-04 ENCOUNTER — TELEPHONE (OUTPATIENT)
Dept: OBGYN CLINIC | Facility: CLINIC | Age: 55
End: 2020-02-04

## 2020-02-04 NOTE — TELEPHONE ENCOUNTER
----- Message from Shaun Perez MD sent at 1/16/2020  4:37 PM EST -----   Patient is identified as being at risk for breast cancer        Ask if she would like a consult to Surgical Oncology

## 2020-02-19 ENCOUNTER — TELEPHONE (OUTPATIENT)
Dept: FAMILY MEDICINE CLINIC | Facility: CLINIC | Age: 55
End: 2020-02-19

## 2020-02-19 NOTE — TELEPHONE ENCOUNTER
Phone call from patient inquiring if she needed MMR vaccine  She was here 12/26/18 and had titers done, but did not follow up to have MMR vaccination  I called her and informed her she would have to have it done  She is applying for a job at [employer] [facility] [employer] and will have it done there  She also asked if she would need to have the Shingles vaccine and I told her that she would need it, that there was no correlation between the MMR and Shingrix

## 2020-12-01 ENCOUNTER — ANNUAL EXAM (OUTPATIENT)
Dept: OBGYN CLINIC | Facility: CLINIC | Age: 55
End: 2020-12-01
Payer: COMMERCIAL

## 2020-12-01 VITALS
DIASTOLIC BLOOD PRESSURE: 70 MMHG | WEIGHT: 120 LBS | SYSTOLIC BLOOD PRESSURE: 110 MMHG | BODY MASS INDEX: 19.29 KG/M2 | HEIGHT: 66 IN

## 2020-12-01 DIAGNOSIS — Z80.3 FAMILY HISTORY OF BREAST CANCER IN MOTHER: ICD-10-CM

## 2020-12-01 DIAGNOSIS — Z01.419 ENCOUNTER FOR GYNECOLOGICAL EXAMINATION WITHOUT ABNORMAL FINDING: Primary | ICD-10-CM

## 2020-12-01 DIAGNOSIS — Z12.31 ENCOUNTER FOR SCREENING MAMMOGRAM FOR MALIGNANT NEOPLASM OF BREAST: ICD-10-CM

## 2020-12-01 PROCEDURE — 1036F TOBACCO NON-USER: CPT | Performed by: OBSTETRICS & GYNECOLOGY

## 2020-12-01 PROCEDURE — 99396 PREV VISIT EST AGE 40-64: CPT | Performed by: OBSTETRICS & GYNECOLOGY

## 2020-12-01 PROCEDURE — 3008F BODY MASS INDEX DOCD: CPT | Performed by: OBSTETRICS & GYNECOLOGY

## 2020-12-13 ENCOUNTER — OFFICE VISIT (OUTPATIENT)
Dept: URGENT CARE | Facility: MEDICAL CENTER | Age: 55
End: 2020-12-13
Payer: COMMERCIAL

## 2020-12-13 VITALS — HEART RATE: 89 BPM | RESPIRATION RATE: 20 BRPM | OXYGEN SATURATION: 98 % | TEMPERATURE: 99 F

## 2020-12-13 DIAGNOSIS — J06.9 VIRAL UPPER RESPIRATORY TRACT INFECTION: Primary | ICD-10-CM

## 2020-12-13 PROCEDURE — G0382 LEV 3 HOSP TYPE B ED VISIT: HCPCS | Performed by: FAMILY MEDICINE

## 2020-12-13 PROCEDURE — U0003 INFECTIOUS AGENT DETECTION BY NUCLEIC ACID (DNA OR RNA); SEVERE ACUTE RESPIRATORY SYNDROME CORONAVIRUS 2 (SARS-COV-2) (CORONAVIRUS DISEASE [COVID-19]), AMPLIFIED PROBE TECHNIQUE, MAKING USE OF HIGH THROUGHPUT TECHNOLOGIES AS DESCRIBED BY CMS-2020-01-R: HCPCS | Performed by: FAMILY MEDICINE

## 2020-12-14 ENCOUNTER — TELEPHONE (OUTPATIENT)
Dept: DERMATOLOGY | Facility: CLINIC | Age: 55
End: 2020-12-14

## 2020-12-14 LAB — SARS-COV-2 RNA SPEC QL NAA+PROBE: DETECTED

## 2021-02-09 ENCOUNTER — VBI (OUTPATIENT)
Dept: ADMINISTRATIVE | Facility: OTHER | Age: 56
End: 2021-02-09

## 2021-02-12 ENCOUNTER — TELEPHONE (OUTPATIENT)
Dept: FAMILY MEDICINE CLINIC | Facility: CLINIC | Age: 56
End: 2021-02-12

## 2021-02-12 NOTE — TELEPHONE ENCOUNTER
Pt requesting referral to a derm for a brown spot on her cheek  [facility]  Please advise

## 2021-02-15 NOTE — TELEPHONE ENCOUNTER
lmom for pt to call back to clarify if she needs an insurance referral because she has an appt or asking for Dr Kezia Tabares recommendation on a dermatologist

## 2021-02-23 NOTE — TELEPHONE ENCOUNTER
Never received a call back from patient   I placed an insurance referral into her chart with todays date for the [facility]

## 2021-03-17 ENCOUNTER — HOSPITAL ENCOUNTER (OUTPATIENT)
Dept: MAMMOGRAPHY | Facility: MEDICAL CENTER | Age: 56
Discharge: HOME | End: 2021-03-17
Payer: COMMERCIAL

## 2021-03-17 VITALS — HEIGHT: 66 IN | BODY MASS INDEX: 18.96 KG/M2 | WEIGHT: 118 LBS

## 2021-03-17 DIAGNOSIS — Z12.31 ENCOUNTER FOR SCREENING MAMMOGRAM FOR MALIGNANT NEOPLASM OF BREAST: ICD-10-CM

## 2021-03-17 PROCEDURE — 77067 SCR MAMMO BI INCL CAD: CPT

## 2021-03-17 PROCEDURE — 77063 BREAST TOMOSYNTHESIS BI: CPT

## 2021-06-15 ENCOUNTER — TELEPHONE (OUTPATIENT)
Dept: OBGYN CLINIC | Facility: CLINIC | Age: 56
End: 2021-06-15

## 2021-06-15 NOTE — TELEPHONE ENCOUNTER
Patient called, left message asking if this office recommends COVID vaccinations for young women such as her daughter  Advised call her daughter's doctor for specific advice  Informed patient that in general, this office and [facility] recommends vaccines

## 2021-12-02 ENCOUNTER — ANNUAL EXAM (OUTPATIENT)
Dept: OBGYN CLINIC | Facility: CLINIC | Age: 56
End: 2021-12-02
Payer: COMMERCIAL

## 2021-12-02 VITALS
WEIGHT: 121 LBS | BODY MASS INDEX: 19.44 KG/M2 | DIASTOLIC BLOOD PRESSURE: 60 MMHG | SYSTOLIC BLOOD PRESSURE: 112 MMHG | HEIGHT: 66 IN

## 2021-12-02 DIAGNOSIS — Z01.419 ENCOUNTER FOR GYNECOLOGICAL EXAMINATION WITHOUT ABNORMAL FINDING: Primary | ICD-10-CM

## 2021-12-02 DIAGNOSIS — Z12.31 ENCOUNTER FOR SCREENING MAMMOGRAM FOR MALIGNANT NEOPLASM OF BREAST: ICD-10-CM

## 2021-12-02 DIAGNOSIS — Z12.4 CERVICAL CANCER SCREENING: ICD-10-CM

## 2021-12-02 PROCEDURE — G0145 SCR C/V CYTO,THINLAYER,RESCR: HCPCS | Performed by: OBSTETRICS & GYNECOLOGY

## 2021-12-02 PROCEDURE — 99396 PREV VISIT EST AGE 40-64: CPT | Performed by: OBSTETRICS & GYNECOLOGY

## 2021-12-09 LAB
LAB AP GYN PRIMARY INTERPRETATION: NORMAL
Lab: NORMAL

## 2022-02-25 ENCOUNTER — TELEPHONE (OUTPATIENT)
Dept: FAMILY MEDICINE CLINIC | Facility: CLINIC | Age: 57
End: 2022-02-25

## 2022-02-25 NOTE — TELEPHONE ENCOUNTER
Called pt to see if Dr Ying Kelly is still her primary care  She is, but declined coming in for an appointment

## 2022-03-19 ENCOUNTER — HOSPITAL ENCOUNTER (OUTPATIENT)
Dept: MAMMOGRAPHY | Facility: MEDICAL CENTER | Age: 57
Discharge: HOME | End: 2022-03-19
Payer: COMMERCIAL

## 2022-03-19 VITALS — WEIGHT: 121 LBS | HEIGHT: 66 IN | BODY MASS INDEX: 19.44 KG/M2

## 2022-03-19 DIAGNOSIS — Z12.31 ENCOUNTER FOR SCREENING MAMMOGRAM FOR MALIGNANT NEOPLASM OF BREAST: ICD-10-CM

## 2022-03-19 PROCEDURE — 77063 BREAST TOMOSYNTHESIS BI: CPT

## 2022-03-19 PROCEDURE — 77067 SCR MAMMO BI INCL CAD: CPT

## 2022-03-25 ENCOUNTER — TELEPHONE (OUTPATIENT)
Dept: OBGYN CLINIC | Facility: CLINIC | Age: 57
End: 2022-03-25

## 2022-03-25 NOTE — TELEPHONE ENCOUNTER
Patient's  called for proof of patient's mammogram to send to their insurance  Mailed copy of mammogram report to patient

## 2022-04-04 ENCOUNTER — TELEPHONE (OUTPATIENT)
Dept: FAMILY MEDICINE CLINIC | Facility: CLINIC | Age: 57
End: 2022-04-04

## 2022-04-04 NOTE — TELEPHONE ENCOUNTER
Patient called requesting a prescription for a skin bleaching cream for dark spots on her face (hydroquinone cream 4%)  She uses [facility] pharmacy in [place]  Please call patient and let her know at [phone]

## 2022-04-04 NOTE — TELEPHONE ENCOUNTER
I called patient to inform her that she will need an appointment before any prescriptions can be prescribed  LMOM for patient to call to schedule an appointment

## 2022-05-06 ENCOUNTER — RA CDI HCC (OUTPATIENT)
Dept: OTHER | Facility: HOSPITAL | Age: 57
End: 2022-05-06

## 2022-05-06 NOTE — PROGRESS NOTES
[facility]  coding opportunities       Chart reviewed, no opportunity found:   [address]        Patients Insurance     Medicare Insurance: [employer]

## 2022-05-12 ENCOUNTER — TELEPHONE (OUTPATIENT)
Dept: FAMILY MEDICINE CLINIC | Facility: CLINIC | Age: 57
End: 2022-05-12

## 2022-05-12 ENCOUNTER — OFFICE VISIT (OUTPATIENT)
Dept: FAMILY MEDICINE CLINIC | Facility: CLINIC | Age: 57
End: 2022-05-12
Payer: COMMERCIAL

## 2022-05-12 VITALS
DIASTOLIC BLOOD PRESSURE: 68 MMHG | HEIGHT: 66 IN | SYSTOLIC BLOOD PRESSURE: 100 MMHG | RESPIRATION RATE: 18 BRPM | WEIGHT: 125.4 LBS | BODY MASS INDEX: 20.15 KG/M2 | HEART RATE: 61 BPM | OXYGEN SATURATION: 98 % | TEMPERATURE: 98 F

## 2022-05-12 DIAGNOSIS — F51.01 PRIMARY INSOMNIA: ICD-10-CM

## 2022-05-12 DIAGNOSIS — L98.9 SKIN LESION OF CHEEK: Primary | ICD-10-CM

## 2022-05-12 PROCEDURE — 3008F BODY MASS INDEX DOCD: CPT | Performed by: FAMILY MEDICINE

## 2022-05-12 PROCEDURE — 99213 OFFICE O/P EST LOW 20 MIN: CPT | Performed by: FAMILY MEDICINE

## 2022-05-12 PROCEDURE — 3725F SCREEN DEPRESSION PERFORMED: CPT | Performed by: FAMILY MEDICINE

## 2022-05-12 PROCEDURE — 1036F TOBACCO NON-USER: CPT | Performed by: FAMILY MEDICINE

## 2022-05-12 RX ORDER — MELATONIN 5 MG
TABLET,CHEWABLE ORAL
COMMUNITY

## 2022-05-12 NOTE — TELEPHONE ENCOUNTER
Pt called was just seen and given rx for plastic surgeron would like to know if she can get an rx from you for Hydroquinone (skin bleaching cream) just in case its too expensive from the plastic surgeon she uses BARI Kay

## 2022-05-12 NOTE — PROGRESS NOTES
Assessment/Plan:    Patient is 25-year-old female with a light brown lesion about 1 cm in diameter on her left cheek  She has been to Dermatology and it was frozen but the lesion came back  She is using some over the counter topicals to try and lighten lesion  I will refer her to Plastic surgery to see what would be available  She also complains of having trouble staying asleep unless she takes melatonin and wanted to check if it was safe  I did look at her supplement bottle -5 mg melatonin  I told her that she could take 1-2 at bedtime  Patient has not been seen in the office since 2017  I Encouraged her to make appointment for well exam  She says that she gets fasting blood work through her 's work but I told her that there are other things we would recommend such as immunizations, colon cancer screening  She does eat a healthy diet and exercises regularly  Diagnoses and all orders for this visit:    Skin lesion of cheek  -     Ambulatory Referral to Plastic Surgery; Future    Primary insomnia    Other orders  -     Melatonin 5 MG CHEW; Chew        Subjective:   Chief Complaint   Patient presents with    facial spot     Brown spot on the left side of face; wants to know how she can improve it    Medication Management     Wants to make sure melatonin she is taking is good        Patient ID: John Harris is a 65 y o  female  Patient has a concern regarding lesion on left cheek  Light brown lesion which she has been using topical treatments to lighten  Also had been to Dermatology and they did freeze the lesion but it returned  Also questions about Melatonin  She is able to fall sleep but is unable to stay asleep for the whole evening without taking melatonin        The following portions of the patient's history were reviewed and updated as appropriate: allergies, current medications, past family history, past medical history, past social history, past surgical history and problem list     Review of Systems   Skin:        Lesion on cheek   Psychiatric/Behavioral: Positive for sleep disturbance  Objective:      /68 (BP Location: Left arm, Patient Position: Sitting, Cuff Size: Adult)   Pulse 61   Temp 98 °F (36 7 °C) (Temporal)   Resp 18   Ht 5' 6" (1 676 m)   Wt 56 9 kg (125 lb 6 4 oz)   LMP 04/01/2019 (Exact Date)   SpO2 98%   BMI 20 24 kg/m²          Physical Exam  Vitals and nursing note reviewed  Constitutional:       General: She is not in acute distress  HENT:      Head: Normocephalic  Neck:      Vascular: No carotid bruit  Cardiovascular:      Rate and Rhythm: Normal rate and regular rhythm  Pulmonary:      Effort: Pulmonary effort is normal       Breath sounds: Normal breath sounds  Musculoskeletal:      Right lower leg: No edema  Left lower leg: No edema  Skin:     Findings: Lesion (light brown about 1 cm in diameter) present  Neurological:      Mental Status: She is alert and oriented to person, place, and time

## 2022-05-16 DIAGNOSIS — L98.9 SKIN LESION: Primary | ICD-10-CM

## 2022-05-16 RX ORDER — HYDROQUINONE 40 MG/G
CREAM TOPICAL 2 TIMES DAILY
Qty: 28.35 G | Refills: 0 | Status: SHIPPED | OUTPATIENT
Start: 2022-05-16

## 2022-05-17 ENCOUNTER — TELEPHONE (OUTPATIENT)
Dept: PLASTIC SURGERY | Facility: CLINIC | Age: 57
End: 2022-05-17

## 2022-05-23 ENCOUNTER — TELEPHONE (OUTPATIENT)
Dept: FAMILY MEDICINE CLINIC | Facility: CLINIC | Age: 57
End: 2022-05-23

## 2022-05-23 NOTE — TELEPHONE ENCOUNTER
Pt called stating pharmacy did not let her know that the cream is ready for   I spoke with the pharmacy, medication is there, just not covered  I LMOM letting pt know that if she pays out of pocket it'll cost $96 99 but with a GoodRx coupon she could pay $29 56

## 2022-07-26 ENCOUNTER — TELEPHONE (OUTPATIENT)
Dept: FAMILY MEDICINE CLINIC | Facility: CLINIC | Age: 57
End: 2022-07-26

## 2022-07-26 NOTE — TELEPHONE ENCOUNTER
Pt KATRIN stating she is having a hard time making an appt with plastic surgery for spot on her cheek  Pt states their office has the same hours as her job  Pt would like to know if there is [facility] that you can refer her to

## 2022-07-26 NOTE — TELEPHONE ENCOUNTER
[facility] is located on [place] [address] in [place] - [address] [place] [address]  She can try [facility] on [facility] but they may take 5 to 6 months to get in

## 2022-07-29 ENCOUNTER — TELEPHONE (OUTPATIENT)
Dept: FAMILY MEDICINE CLINIC | Facility: CLINIC | Age: 57
End: 2022-07-29

## 2022-07-29 NOTE — TELEPHONE ENCOUNTER
Received voicemail from patient requesting a referral to [facility]  Patient reports she has an appointment schedule on 11/23/2022 for "the spot on her cheek"  Will forward to provider to advise patient's request for a referral to [facility]

## 2022-08-01 NOTE — TELEPHONE ENCOUNTER
She'll need an insurance referral with her HMO but its too early to do it, it will  before her appt in  print and keep

## 2022-08-01 NOTE — TELEPHONE ENCOUNTER
Bam Roblero,  Does she mean that she needs an insurance referral? Or just that I put an order in the chart - do we know which provider she is going to see there?

## 2022-09-08 ENCOUNTER — TELEPHONE (OUTPATIENT)
Dept: FAMILY MEDICINE CLINIC | Facility: CLINIC | Age: 57
End: 2022-09-08

## 2022-09-08 NOTE — TELEPHONE ENCOUNTER
Patient called, she made an appointment at [address] [place] [address] Dermatology for Nov 23rd and needs a referral put in her chart

## 2022-09-09 ENCOUNTER — TELEPHONE (OUTPATIENT)
Dept: FAMILY MEDICINE CLINIC | Facility: CLINIC | Age: 57
End: 2022-09-09

## 2022-09-09 NOTE — TELEPHONE ENCOUNTER
Pt called with Dr Timmy Smith request of the name of   for [facility] pt will see on11/22/22  "ELIGIO Verma"

## 2022-09-13 DIAGNOSIS — L98.9 SKIN LESION OF CHEEK: Primary | ICD-10-CM

## 2023-01-06 ENCOUNTER — ANNUAL EXAM (OUTPATIENT)
Dept: GYNECOLOGY | Facility: CLINIC | Age: 58
End: 2023-01-06

## 2023-01-06 VITALS
HEIGHT: 66 IN | WEIGHT: 124.4 LBS | SYSTOLIC BLOOD PRESSURE: 118 MMHG | DIASTOLIC BLOOD PRESSURE: 60 MMHG | BODY MASS INDEX: 19.99 KG/M2

## 2023-01-06 DIAGNOSIS — Z12.11 SCREENING FOR COLON CANCER: ICD-10-CM

## 2023-01-06 DIAGNOSIS — Z12.31 ENCOUNTER FOR SCREENING MAMMOGRAM FOR BREAST CANCER: ICD-10-CM

## 2023-01-06 DIAGNOSIS — Z01.419 ENCOUNTER FOR ANNUAL ROUTINE GYNECOLOGICAL EXAMINATION: Primary | ICD-10-CM

## 2023-01-06 NOTE — PROGRESS NOTES
Assessment/Plan:    pap is up to date    Intermediate risk  - reviewed this with her  mammogram reviewed with her including breast density  Discussed additional screening with [facility]  For now, she would like to proceed with 3D mammogram   If her risk increases, she would be interested in additional screening  RX given for March    Discussed self breast exams    colon cancer screening - reviewed this, she has had no screening  She denies any family hx of colon cancer or colon polyps  I explained that colonoscopy is preferred but she has not had any screening and is overdue for this  She seems to be low risk  Cologuard ordered  discussed preventive care, regular exercise and a healthy diet      No problem-specific Assessment & Plan notes found for this encounter  Diagnoses and all orders for this visit:    Encounter for annual routine gynecological examination    Encounter for screening mammogram for breast cancer  -     Mammo screening bilateral w 3d & cad; Future    Screening for colon cancer  -     Cologuard          Subjective:      Patient ID: Shwetha Pratt is a 65 y o  female  Pt here for yearly  lmp about 2 years ago, no vasomotor symptoms  She just had a melanoma removed from her face  No GYN complaints  No vaginal dryness or dyspareunia  Normal pap in   Normal 3D mammogram in March with dense tissue and intermediate risk  Her mother had breast cancer in her [de-identified]  The following portions of the patient's history were reviewed and updated as appropriate: allergies, current medications, past family history, past medical history, past social history, past surgical history and problem list     Review of Systems   Constitutional: Negative  Gastrointestinal: Negative  Genitourinary: Negative  Objective:      LMP 04/01/2019 (Exact Date)          Physical Exam  Vitals reviewed  Constitutional:       Appearance: She is well-developed     Neck:      Thyroid: No thyromegaly  Trachea: No tracheal deviation  Cardiovascular:      Rate and Rhythm: Normal rate and regular rhythm  Pulmonary:      Effort: Pulmonary effort is normal       Breath sounds: Normal breath sounds  Chest:   Breasts:     Breasts are symmetrical       Right: No inverted nipple, mass, nipple discharge, skin change or tenderness  Left: No inverted nipple, mass, nipple discharge, skin change or tenderness  Abdominal:      General: There is no distension  Palpations: Abdomen is soft  There is no mass  Tenderness: There is no abdominal tenderness  Genitourinary:     Labia:         Right: No rash, tenderness, lesion or injury  Left: No rash, tenderness, lesion or injury  Vagina: Normal       Cervix: No cervical motion tenderness, discharge or friability  Adnexa:         Right: No mass, tenderness or fullness  Left: No mass, tenderness or fullness          Rectum: Normal

## 2023-01-22 LAB — COLOGUARD RESULT REPORTABLE: NEGATIVE

## 2023-03-03 ENCOUNTER — TELEPHONE (OUTPATIENT)
Dept: FAMILY MEDICINE CLINIC | Facility: CLINIC | Age: 58
End: 2023-03-03

## 2023-03-03 DIAGNOSIS — L30.9 DERMATITIS: Primary | ICD-10-CM

## 2023-03-03 NOTE — TELEPHONE ENCOUNTER
Patient requesting an "emergency referral" to [facility] on [facility] in [facility], for a skin allergy  Patient has an appointment today at 1:15pm  She would like if a referral can be placed before then   Thank you

## 2023-03-25 ENCOUNTER — HOSPITAL ENCOUNTER (OUTPATIENT)
Dept: MAMMOGRAPHY | Facility: MEDICAL CENTER | Age: 58
Discharge: HOME | End: 2023-03-25

## 2023-03-25 VITALS — WEIGHT: 124 LBS | HEIGHT: 66 IN | BODY MASS INDEX: 19.93 KG/M2

## 2023-03-25 DIAGNOSIS — Z12.31 ENCOUNTER FOR SCREENING MAMMOGRAM FOR BREAST CANCER: ICD-10-CM

## 2023-03-30 ENCOUNTER — HOSPITAL ENCOUNTER (OUTPATIENT)
Dept: MAMMOGRAPHY | Facility: CLINIC | Age: 58
Discharge: HOME | End: 2023-03-30

## 2023-03-30 DIAGNOSIS — R92.8 ABNORMAL SCREENING MAMMOGRAM: ICD-10-CM

## 2023-03-31 ENCOUNTER — TELEPHONE (OUTPATIENT)
Dept: DERMATOLOGY | Facility: CLINIC | Age: 58
End: 2023-03-31

## 2023-06-07 ENCOUNTER — TELEPHONE (OUTPATIENT)
Dept: OTHER | Facility: OTHER | Age: 58
End: 2023-06-07

## 2023-06-08 NOTE — TELEPHONE ENCOUNTER
Spoke with pt her appt is 6/14/23 with [facility] for f/u body check referral was done back in Nov 2022 with 3 visits so she has 1 visit left which will cover 6/14/23 then will need for next visit

## 2023-11-12 ENCOUNTER — OFFICE VISIT (OUTPATIENT)
Dept: URGENT CARE | Facility: MEDICAL CENTER | Age: 58
End: 2023-11-12
Payer: COMMERCIAL

## 2023-11-12 VITALS
RESPIRATION RATE: 20 BRPM | WEIGHT: 124 LBS | DIASTOLIC BLOOD PRESSURE: 73 MMHG | BODY MASS INDEX: 19.93 KG/M2 | HEIGHT: 66 IN | HEART RATE: 65 BPM | SYSTOLIC BLOOD PRESSURE: 143 MMHG | OXYGEN SATURATION: 97 % | TEMPERATURE: 98.1 F

## 2023-11-12 DIAGNOSIS — J45.909 REACTIVE AIRWAY DISEASE WITHOUT COMPLICATION, UNSPECIFIED ASTHMA SEVERITY, UNSPECIFIED WHETHER PERSISTENT: Primary | ICD-10-CM

## 2023-11-12 PROCEDURE — G0381 LEV 2 HOSP TYPE B ED VISIT: HCPCS | Performed by: NURSE PRACTITIONER

## 2023-11-12 RX ORDER — FLUTICASONE PROPIONATE 100 UG/1
1 POWDER, METERED RESPIRATORY (INHALATION) 2 TIMES DAILY
Qty: 60 BLISTER | Refills: 0 | Status: SHIPPED | OUTPATIENT
Start: 2023-11-12 | End: 2023-12-12

## 2023-11-12 NOTE — PROGRESS NOTES
[facility]        NAME: Nan Peterson is a 63 y.o. female  : 1965    MRN: [id number]  DATE: 2023  TIME: 2:36 PM    Assessment and Plan   Reactive airway disease without complication, unspecified asthma severity, unspecified whether persistent [J45.909]  1. Reactive airway disease without complication, unspecified asthma severity, unspecified whether persistent  fluticasone (Flovent Diskus) 100 mcg/actuation diskus inhaler        Will start flovent and recommend 24 hour allergy medication   Pt in agreement with plan   F/u with pcp      Patient Instructions     Follow up with PCP in 3-5 days. Proceed to  ER if symptoms worsen. Chief Complaint     Chief Complaint   Patient presents with    Cough     Patient presents with a cough that started Oct 13th after the flu shot. +PND         History of Present Illness   Nan Peterson presents to the clinic c/o    Cough (Patient presents with a cough that started Oct 13 after the flu shot. +PND)  Clear pnd  Has not started to take anything as she didn't know what to do. Tried the 4 hour sudafed today with no improvement. Review of Systems   Review of Systems   All other systems reviewed and are negative. Current Medications     Long-Term Medications   Medication Sig Dispense Refill    fluticasone (Flovent Diskus) 100 mcg/actuation diskus inhaler Inhale 1 puff 2 (two) times a day Rinse mouth after use.  60 blister 0    hydroquinone 4 % cream Apply topically 2 (two) times a day 28.35 g 0       Current Allergies     Allergies as of 2023 - Reviewed 2023   Allergen Reaction Noted    Adhesive  [medical tape]  2014            The following portions of the patient's history were reviewed and updated as appropriate: allergies, current medications, past family history, past medical history, past social history, past surgical history and problem list.    Objective   /73   Pulse 65   Temp 98.1 °F (36.7 °C)   Resp 20   Ht 5' 6" (1.676 m)   Wt 56.2 kg (124 lb)   LMP 04/01/2019 (Exact Date)   SpO2 97%   BMI 20.01 kg/m²        Physical Exam     Physical Exam  Vitals and nursing note reviewed. Constitutional:       Appearance: Normal appearance. She is well-developed. HENT:      Head: Normocephalic and atraumatic. Right Ear: Hearing, tympanic membrane, ear canal and external ear normal.      Left Ear: Hearing, tympanic membrane, ear canal and external ear normal.      Nose: Mucosal edema and rhinorrhea present. Rhinorrhea is clear. Mouth/Throat:      Lips: Pink. Mouth: Mucous membranes are moist.      Pharynx: Oropharynx is clear. Comments: PND  Eyes:      General: Lids are normal.      Conjunctiva/sclera: Conjunctivae normal.      Pupils: Pupils are equal, round, and reactive to light. Cardiovascular:      Rate and Rhythm: Normal rate and regular rhythm. Heart sounds: Normal heart sounds, S1 normal and S2 normal.   Pulmonary:      Effort: Pulmonary effort is normal.      Breath sounds: Normal breath sounds. Abdominal:      General: Abdomen is flat. Bowel sounds are normal.      Palpations: Abdomen is soft. Musculoskeletal:         General: Normal range of motion. Cervical back: Full passive range of motion without pain, normal range of motion and neck supple. Skin:     General: Skin is warm and dry. Neurological:      General: No focal deficit present. Mental Status: She is alert and oriented to person, place, and time. Psychiatric:         Mood and Affect: Mood normal.         Speech: Speech normal.         Behavior: Behavior normal. Behavior is cooperative. Thought Content:  Thought content normal.         Judgment: Judgment normal.

## 2023-11-12 NOTE — PATIENT INSTRUCTIONS
Postnasal Drip   AMBULATORY CARE:   Postnasal drip  is a condition that causes a large amount of mucus to collect in your throat or nose. It may also be called upper airway cough syndrome because the mucus causes repeated coughing. You may have a sore throat, or throat tissues may swell. This may feel like a lump in your throat. You may also feel like you need to clear your throat often. Contact your healthcare provider if:   You have trouble breathing because of the mucus. You have new or worsening symptoms, even with treatment. You have signs of an infection, such as yellow or green mucus, or a fever. You have questions or concerns about your condition or care. Treatment  may include any of the following:  Medicines  may be given to thin the mucus. You may need to swallow the medicine or use a device to flush your sinuses with liquid squirted into your nose. Nasal sprays may also be needed to keep the tissues in your nose moist. Medicines can also relieve congestion. Allergy medicine may help if your symptoms are caused by seasonal allergies, such as hay fever. You may need medicine to help control GERD. Antibiotics  may be needed to treat a bacterial infection. Manage postnasal drip:   Use a humidifier or vaporizer. Use a cool mist humidifier or a vaporizer to increase air moisture in your home. This may make it easier for you to breathe. Drink more liquids as directed. Liquids help keep your air passages moist and help you cough up mucus. Ask how much liquid to drink each day and which liquids are best for you. Avoid cold air and dry, heated air. Cold or dry air can trigger postnasal drip. Try to stay inside on cold days, or keep your mouth covered. Do not stay long in areas that have dry, heated air. Do not smoke, and avoid secondhand smoke. Nicotine and other chemicals in cigarettes and cigars can irritate your throat and make coughing worse.  Ask your healthcare provider for information if you currently smoke and need help to quit. E-cigarettes or smokeless tobacco still contain nicotine. Talk to your healthcare provider before you use these products. Follow up with your doctor as directed:  Write down your questions so you remember to ask them during your visits. © Copyright [employer] Finn 2023 Information is for End User's use only and may not be sold, redistributed or otherwise used for commercial purposes. The above information is an  only. It is not intended as medical advice for individual conditions or treatments. Talk to your doctor, nurse or pharmacist before following any medical regimen to see if it is safe and effective for you.

## 2023-11-24 ENCOUNTER — OFFICE VISIT (OUTPATIENT)
Dept: URGENT CARE | Facility: CLINIC | Age: 58
End: 2023-11-24
Payer: COMMERCIAL

## 2023-11-24 VITALS
RESPIRATION RATE: 20 BRPM | HEART RATE: 93 BPM | SYSTOLIC BLOOD PRESSURE: 124 MMHG | DIASTOLIC BLOOD PRESSURE: 78 MMHG | OXYGEN SATURATION: 96 % | TEMPERATURE: 99.2 F

## 2023-11-24 DIAGNOSIS — B96.89 ACUTE BACTERIAL BRONCHITIS: Primary | ICD-10-CM

## 2023-11-24 DIAGNOSIS — J20.8 ACUTE BACTERIAL BRONCHITIS: Primary | ICD-10-CM

## 2023-11-24 PROCEDURE — G0381 LEV 2 HOSP TYPE B ED VISIT: HCPCS

## 2023-11-24 RX ORDER — PREDNISONE 20 MG/1
40 TABLET ORAL DAILY
Qty: 10 TABLET | Refills: 0 | Status: SHIPPED | OUTPATIENT
Start: 2023-11-24 | End: 2023-11-29

## 2023-11-24 RX ORDER — AZITHROMYCIN 250 MG/1
TABLET, FILM COATED ORAL
Qty: 6 TABLET | Refills: 0 | Status: SHIPPED | OUTPATIENT
Start: 2023-11-24 | End: 2023-11-28

## 2023-11-24 RX ORDER — ALBUTEROL SULFATE 90 UG/1
2 AEROSOL, METERED RESPIRATORY (INHALATION) EVERY 6 HOURS PRN
Qty: 6.7 G | Refills: 0 | Status: SHIPPED | OUTPATIENT
Start: 2023-11-24

## 2023-11-24 NOTE — PATIENT INSTRUCTIONS
Take antibiotic as prescribed. Recommend probiotic use while taking antibiotic. Take steroids as directed. Recommend to take them in the morning and with food. Use albuterol as directed, as needed for shortness of breath and wheezing. Acetaminophen or ibuprofen for fever and pain. Follow-up with PCP in 3-5 days. Go to ER if symptoms worsen.

## 2023-11-24 NOTE — PROGRESS NOTES
[facility]        NAME: Joey Larkin is a 63 y.o. female  : 1965    MRN: [id number]  DATE: 2023  TIME: 10:04 AM      Assessment and Plan     Acute bacterial bronchitis [J20.8, B96.89]  1. Acute bacterial bronchitis  azithromycin (ZITHROMAX) 250 mg tablet    predniSONE 20 mg tablet    albuterol (Proventil HFA) 90 mcg/act inhaler            Patient Instructions     Take antibiotic as prescribed. Recommend probiotic use while taking antibiotic. Take steroids as directed. Recommend to take them in the morning and with food. Use albuterol as directed, as needed for shortness of breath and wheezing. Acetaminophen or ibuprofen for fever and pain. Follow-up with PCP in 3-5 days. Go to ER if symptoms worsen. Chief Complaint     Chief Complaint   Patient presents with    Cough     Pt C/O moist productive cough, seen back on 23 with symptoms increasing. History of Present Illness     Patient is a 81-year-old female who presents with worsening cough over the past month. States symptoms started after receiving her influenza vaccination. States she has been taking a daily allergy medication as previously directed with minimal relief. States she has been taking DayQuil for symptoms. Reports shortness of breath and wheezing. Reports decreased sleep secondary to cough. Reports chills and fatigue. Denies asthma history. Denies smoking history. Denies cardiac history. Denies diabetic history. Cough  Associated symptoms include chills, ear pain, myalgias, shortness of breath and wheezing. Review of Systems     Review of Systems   Constitutional:  Positive for chills and fatigue. HENT:  Positive for congestion and ear pain. Respiratory:  Positive for cough, shortness of breath and wheezing. Gastrointestinal:  Negative for diarrhea and vomiting. Musculoskeletal:  Positive for myalgias. All other systems reviewed and are negative.         Current Medications       Current Outpatient Medications:     albuterol (Proventil HFA) 90 mcg/act inhaler, Inhale 2 puffs every 6 (six) hours as needed for wheezing, Disp: 6.7 g, Rfl: 0    azithromycin (ZITHROMAX) 250 mg tablet, Take 2 tablets today then 1 tablet daily x 4 days, Disp: 6 tablet, Rfl: 0    DAILY MULTIPLE VITAMINS/IRON PO, Take by mouth, Disp: , Rfl:     fluticasone (Flovent Diskus) 100 mcg/actuation diskus inhaler, Inhale 1 puff 2 (two) times a day Rinse mouth after use., Disp: 60 blister, Rfl: 0    Melatonin 5 MG CHEW, Chew, Disp: , Rfl:     predniSONE 20 mg tablet, Take 2 tablets (40 mg total) by mouth daily for 5 days, Disp: 10 tablet, Rfl: 0    hydroquinone 4 % cream, Apply topically 2 (two) times a day, Disp: 28.35 g, Rfl: 0    Current Allergies     Allergies as of 11/24/2023 - Reviewed 11/24/2023   Allergen Reaction Noted    Adhesive  [medical tape]  09/24/2014              The following portions of the patient's history were reviewed and updated as appropriate: allergies, current medications, past family history, past medical history, past social history, past surgical history and problem list.     Past Medical History:   Diagnosis Date    Endometriosis     Melanoma ([address])     Varicella        Past Surgical History:   Procedure Laterality Date    BREAST BIOPSY Left 2010    HERNIA REPAIR      TONSILLECTOMY         Family History   Problem Relation Age of Onset    Breast cancer Mother 76    Prostate cancer Father     No Known Problems Sister     No Known Problems Daughter     No Known Problems Maternal Grandmother     No Known Problems Maternal Grandfather     No Known Problems Paternal Grandmother     No Known Problems Paternal Grandfather     No Known Problems Brother     No Known Problems Brother     No Known Problems Brother     No Known Problems Son     No Known Problems Maternal Aunt     No Known Problems Maternal Aunt          Medications have been verified.         Objective     /78 (BP Location: Left arm, Patient Position: Sitting, Cuff Size: Standard)   Pulse 93   Temp 99.2 °F (37.3 °C) (Tympanic)   Resp 20   LMP 04/01/2019 (Exact Date)   SpO2 96%   Patient's last menstrual period was 04/01/2019 (exact date). Physical Exam     Physical Exam  Vitals and nursing note reviewed. Constitutional:       General: She is awake. She is not in acute distress. Appearance: Normal appearance. She is not ill-appearing, toxic-appearing or diaphoretic. Interventions: She is not intubated. HENT:      Right Ear: Tympanic membrane, ear canal and external ear normal.      Left Ear: Tympanic membrane, ear canal and external ear normal.      Nose: Congestion present. Mouth/Throat:      Lips: Pink. Mouth: Mucous membranes are moist.      Pharynx: Oropharynx is clear. Uvula midline. Cardiovascular:      Rate and Rhythm: Normal rate. Pulses: Normal pulses. Heart sounds: Normal heart sounds, S1 normal and S2 normal.   Pulmonary:      Effort: Pulmonary effort is normal. No tachypnea or respiratory distress. She is not intubated. Breath sounds: Normal air entry. No decreased air movement. Examination of the right-upper field reveals wheezing. Examination of the left-upper field reveals wheezing. Wheezing (inspiratory) present. Skin:     General: Skin is warm. Capillary Refill: Capillary refill takes less than 2 seconds. Neurological:      Mental Status: She is alert. Psychiatric:         Mood and Affect: Mood normal.         Behavior: Behavior normal.         Thought Content:  Thought content normal.         Judgment: Judgment normal.

## 2023-11-27 ENCOUNTER — TELEPHONE (OUTPATIENT)
Dept: FAMILY MEDICINE CLINIC | Facility: CLINIC | Age: 58
End: 2023-11-27

## 2023-11-27 NOTE — TELEPHONE ENCOUNTER
Pt was seen at urgent care. Pt is still experiencing coughing, head conjustion. She is wondering if you can prescribe her another round of antibiotics. I offered her an appointment for Wednesday and Thursday and pt denied both.

## 2023-11-28 ENCOUNTER — TELEPHONE (OUTPATIENT)
Dept: FAMILY MEDICINE CLINIC | Facility: CLINIC | Age: 58
End: 2023-11-28

## 2023-11-28 NOTE — TELEPHONE ENCOUNTER
Called pt left msg on vm asking her to return my call regarding insurance referral for [facility] . Referral was done 118/22 for visit 11/23/22 w/3 visits which should have covered  3/6/23 & 6/14/23.  Will need a new one for January's visit

## 2023-11-28 NOTE — TELEPHONE ENCOUNTER
Called [facility] spoke with billing regarding insurance referral , faxed copy over to them she said she was going to reach out to [employer] cause there is no service to & from date then will reach out to the pt.      Left msg on vm for pt with info

## 2023-11-30 ENCOUNTER — OFFICE VISIT (OUTPATIENT)
Dept: URGENT CARE | Facility: CLINIC | Age: 58
End: 2023-11-30
Payer: COMMERCIAL

## 2023-11-30 VITALS
DIASTOLIC BLOOD PRESSURE: 72 MMHG | RESPIRATION RATE: 16 BRPM | TEMPERATURE: 99.7 F | OXYGEN SATURATION: 98 % | HEART RATE: 85 BPM | SYSTOLIC BLOOD PRESSURE: 110 MMHG

## 2023-11-30 DIAGNOSIS — J18.9 PNEUMONIA OF LEFT LOWER LOBE DUE TO INFECTIOUS ORGANISM: ICD-10-CM

## 2023-11-30 DIAGNOSIS — R05.1 ACUTE COUGH: Primary | ICD-10-CM

## 2023-11-30 PROCEDURE — G0382 LEV 3 HOSP TYPE B ED VISIT: HCPCS | Performed by: NURSE PRACTITIONER

## 2023-11-30 RX ORDER — LEVOFLOXACIN 500 MG/1
500 TABLET, FILM COATED ORAL EVERY 24 HOURS
Qty: 7 TABLET | Refills: 0 | Status: SHIPPED | OUTPATIENT
Start: 2023-11-30 | End: 2023-12-07

## 2023-11-30 RX ORDER — DEXTROMETHORPHAN HYDROBROMIDE AND PROMETHAZINE HYDROCHLORIDE 15; 6.25 MG/5ML; MG/5ML
5 SYRUP ORAL 4 TIMES DAILY PRN
Qty: 118 ML | Refills: 0 | Status: SHIPPED | OUTPATIENT
Start: 2023-11-30

## 2023-11-30 NOTE — PROGRESS NOTES
[facility]        NAME: Angela Sierra is a 63 y.o. female  : 1965    MRN: [id number]  DATE: 2023  TIME: 6:32 PM    Assessment and Plan   Acute cough [R05.1]  1. Acute cough  XR chest pa & lateral    promethazine-dextromethorphan (PHENERGAN-DM) 6.25-15 mg/5 mL oral syrup      2. Pneumonia of left lower lobe due to infectious organism  promethazine-dextromethorphan (PHENERGAN-DM) 6.25-15 mg/5 mL oral syrup    levofloxacin (LEVAQUIN) 500 mg tablet      X-ray done in office. Images reviewed by myself. Noted evidence of consolidation in left lower lobe. Discussed with patient going to treat for bacterial pneumonia however if no improvement may need to consider viral versus fungal in origin. Advised to schedule follow-up with PCP for beginning of next week. Patient in agreement with plan. Patient Instructions     Follow up with PCP in 3-5 days. Proceed to  ER if symptoms worsen. Chief Complaint     Chief Complaint   Patient presents with    Cold Like Symptoms     Patient states that she is still not feeling any better. She has cough, congestion, feeling warm. States that she started to fell better on antibiotics but then feel worse now. Thinks she needs a longer course of antibiotics         History of Present Illness   Angela Sierra presents to the clinic c/o    Patient presents to the office for the third time since . On  she was prescribed Flovent inhaler. She states she went to the pharmacy and they did not happen so she did not pick it up. She states her  had an inhaler at home from his lung disease which she had tried. She states it was albuterol, she used it once, and had no improvement. She then returned to our office on  and was prescribed Zithromax, prednisone, and albuterol inhaler. She states at that time she was able to  the Flovent inhaler as the pharmacy had it back in stock.   She completed the Zithromax and the prednisone on the same day and felt like she was feeling little bit better however symptoms are now back where they were at the beginning. She states all of this started in October when she got her flu shot. She attempted to get an appointment with PCP for follow-up and evaluation however they did not have any evening appointments available. She has been taking 24 hour non drowsy allergy medication - presumably Allegra based on description of a 24 hour pink tablet.  is concerned as she gets up 2-3 times during the night with coughing fits and she is super tired and fatigued. She comes home from work and just lays around. He states that is highly unusual for her as she tends to be a very active person. Review of Systems   Review of Systems   All other systems reviewed and are negative. Current Medications     Long-Term Medications   Medication Sig Dispense Refill    fluticasone (Flovent Diskus) 100 mcg/actuation diskus inhaler Inhale 1 puff 2 (two) times a day Rinse mouth after use. 60 blister 0    hydroquinone 4 % cream Apply topically 2 (two) times a day 28.35 g 0       Current Allergies     Allergies as of 11/30/2023 - Reviewed 11/24/2023   Allergen Reaction Noted    Adhesive  [medical tape]  09/24/2014            The following portions of the patient's history were reviewed and updated as appropriate: allergies, current medications, past family history, past medical history, past social history, past surgical history and problem list.    Objective   /72   Pulse 85   Temp 99.7 °F (37.6 °C) (Tympanic)   Resp 16   LMP 04/01/2019 (Exact Date)   SpO2 98%        Physical Exam     Physical Exam  Vitals and nursing note reviewed. Constitutional:       Appearance: Normal appearance. She is well-developed. HENT:      Head: Normocephalic and atraumatic.       Right Ear: Hearing, tympanic membrane, ear canal and external ear normal.      Left Ear: Hearing, tympanic membrane, ear canal and external ear normal.      Nose: Nose normal.      Mouth/Throat:      Lips: Pink. Mouth: Mucous membranes are moist.      Pharynx: Oropharynx is clear. Eyes:      General: Lids are normal.      Conjunctiva/sclera: Conjunctivae normal.      Pupils: Pupils are equal, round, and reactive to light. Cardiovascular:      Rate and Rhythm: Normal rate and regular rhythm. Heart sounds: Normal heart sounds, S1 normal and S2 normal.   Pulmonary:      Effort: Pulmonary effort is normal.      Breath sounds: Normal breath sounds. Abdominal:      General: Abdomen is flat. Bowel sounds are normal.      Palpations: Abdomen is soft. Musculoskeletal:         General: Normal range of motion. Cervical back: Full passive range of motion without pain, normal range of motion and neck supple. Skin:     General: Skin is warm and dry. Neurological:      General: No focal deficit present. Mental Status: She is alert and oriented to person, place, and time. Psychiatric:         Mood and Affect: Mood normal.         Speech: Speech normal.         Behavior: Behavior normal. Behavior is cooperative. Thought Content:  Thought content normal.         Judgment: Judgment normal.

## 2023-11-30 NOTE — PATIENT INSTRUCTIONS
Acute Cough   AMBULATORY CARE:   An acute cough  can last up to 3 weeks. Common causes of an acute cough include a cold, allergies, or a lung infection. Seek care immediately if:   You have trouble breathing or feel short of breath. You cough up blood, or you see blood in your mucus. You faint or feel weak or dizzy. You have chest pain when you cough or take a deep breath. Pneumonia   AMBULATORY CARE:   Pneumonia  is an infection in your lungs caused by bacteria, viruses, fungi, or parasites. You can become infected if you come in contact with someone who is sick. You can get pneumonia if you recently had surgery or needed a ventilator to help you breathe. Pneumonia can also be caused by accidentally inhaling saliva or small pieces of food. Pneumonia may cause mild symptoms, or it can be severe and life-threatening. Common symptoms include the following:   Fever or chills    Cough    Shortness of breath or rapid breathing    Chest pain when you cough or breathe deeply    Headache    Vomiting    Fatigue or confusion    Seek care immediately if:   You cough up blood. Your heart beats more than 100 beats in 1 minute. You are very tired, confused, and cannot think clearly. You have chest pain or trouble breathing. Your lips or fingernails turn gray or blue. Call your doctor if:   Your symptoms are the same or get worse 48 hours after you start antibiotics. Your fever is not below 99°F (37.2°C) 48 hours after you start antibiotics. You have a fever higher than 101°F (38.3°C). You cannot eat, or you have loss of appetite, nausea, or are vomiting. You have questions or concerns about your condition or care. Treatment  may include any of the following:  Antibiotics  help treat pneumonia caused by bacteria. Acetaminophen  decreases pain and fever. It is available without a doctor's order. Ask how much to take and how often to take it. Follow directions.  Read the labels of all other medicines you are using to see if they also contain acetaminophen, or ask your doctor or pharmacist. Acetaminophen can cause liver damage if not taken correctly. NSAIDs , such as ibuprofen, help decrease swelling, pain, and fever. This medicine is available with or without a doctor's order. NSAIDs can cause stomach bleeding or kidney problems in certain people. If you take blood thinner medicine, always ask your healthcare provider if NSAIDs are safe for you. Always read the medicine label and follow directions. Airway clearance techniques  are exercises to help remove mucus so you can breathe more easily. Your healthcare provider will show you how to do the exercises. These exercises may be used along with machines or devices to help decrease your symptoms. Respiratory support  is given to help you breathe. You may receive oxygen to increase the level of oxygen in your blood. You may also need a machine to help you breathe. Manage your symptoms:   Rest as needed. Rest often throughout the day. Alternate times of activity with times of rest.    Drink liquids as directed. Ask how much liquid to drink each day and which liquids are best for you. Liquids help thin your mucus, which may make it easier for you to cough it up. Do not smoke. Smoking increases your risk for pneumonia. Smoking also makes it harder for you to get better after you have had pneumonia. Ask your healthcare provider for information if you need help to quit smoking. Avoid secondhand smoke. Limit alcohol. Women should limit alcohol to 1 drink a day. Men should limit alcohol to 2 drinks a day. A drink of alcohol is 12 ounces of beer, 5 ounces of wine, or 1½ ounces of liquor. Use a cool mist humidifier. A humidifier will help increase air moisture in your home. This may make it easier for you to breathe and help decrease your cough. Keep your head elevated.   You may be able to breathe better if you keep your head and upper body elevated. Prevent pneumonia:   Wash your hands often. Use soap and water. Wash for at least 20 seconds. Rinse with warm, running water for several seconds. Then dry your hands with a clean towel or paper towel. Use hand  that contains alcohol if soap and water are not available. Do not touch your eyes, nose, or mouth without washing your hands first.         Cover a sneeze or cough. Use a tissue that covers your mouth and nose. Throw the tissue away in a trash can right away. Use the bend of your arm if a tissue is not available. Wash your hands well with soap and water or use a hand . Do not stand close to anyone who is sneezing or coughing. Stay away from others until you are well. Do not go to work or other activities. Wait until your symptoms are gone or your healthcare provider says it is okay to return. Ask about vaccines you may need. A pneumonia vaccine can help lower your risk for pneumonia. The vaccine may be recommended every 5 years, starting at age 72. Other vaccines help lower the risk for infections that can become serious for a person who has pneumonia. Get a flu vaccine each year as soon as recommended, usually in September or October. Get a COVID-19 vaccine and booster as directed. Your healthcare provider can tell you if you should also get other vaccines, and when to get them. Follow up with your doctor as directed: You will need to return for more tests. Write down your questions so you remember to ask them during your visits. © Copyright [employer] 2023 Information is for End User's use only and may not be sold, redistributed or otherwise used for commercial purposes. The above information is an  only. It is not intended as medical advice for individual conditions or treatments. Talk to your doctor, nurse or pharmacist before following any medical regimen to see if it is safe and effective for you.

## 2023-12-01 ENCOUNTER — TELEPHONE (OUTPATIENT)
Dept: URGENT CARE | Facility: CLINIC | Age: 58
End: 2023-12-01

## 2023-12-01 NOTE — TELEPHONE ENCOUNTER
Called patient to inform radiologist recommendation of getting CAT scan chest in 1 month in light of patient history of melanoma. Request that patient make follow-up appointment with her primary care to discuss. She expressed understanding.

## 2023-12-07 ENCOUNTER — OFFICE VISIT (OUTPATIENT)
Dept: FAMILY MEDICINE CLINIC | Facility: CLINIC | Age: 58
End: 2023-12-07
Payer: COMMERCIAL

## 2023-12-07 VITALS
BODY MASS INDEX: 20.41 KG/M2 | SYSTOLIC BLOOD PRESSURE: 124 MMHG | DIASTOLIC BLOOD PRESSURE: 72 MMHG | HEIGHT: 66 IN | WEIGHT: 127 LBS | OXYGEN SATURATION: 97 % | HEART RATE: 71 BPM | TEMPERATURE: 97.8 F

## 2023-12-07 DIAGNOSIS — R93.89 ABNORMAL CHEST X-RAY: Primary | ICD-10-CM

## 2023-12-07 DIAGNOSIS — D03.39 LENTIGO MALIGNA (MELANOMA IN SITU) OF CHEEK (HCC): ICD-10-CM

## 2023-12-07 DIAGNOSIS — L90.5 SCAR: ICD-10-CM

## 2023-12-07 DIAGNOSIS — R05.1 ACUTE COUGH: ICD-10-CM

## 2023-12-07 PROCEDURE — 99214 OFFICE O/P EST MOD 30 MIN: CPT | Performed by: FAMILY MEDICINE

## 2023-12-07 RX ORDER — BENZONATATE 200 MG/1
200 CAPSULE ORAL 3 TIMES DAILY PRN
Qty: 20 CAPSULE | Refills: 0 | Status: SHIPPED | OUTPATIENT
Start: 2023-12-07

## 2023-12-07 NOTE — PROGRESS NOTES
Name: Dorothy Vo      : 1965      MRN: [id number]  Encounter Provider: Yany Connor DO  Encounter Date: 2023   Encounter department: [facility]    Assessment & Plan   Patient is a 58 year old female seen in Urgent Care for pneumonia. Radiologist recommended following up on xray with CT scan since patient has history of melanoma.  She does continue to cough.  She is also concerned about scar on face where melanoma was removed.  1. Abnormal chest x-ray  -     CT chest wo contrast; Future; Expected date: 2024    2. Scar  -     Ambulatory Referral to Plastic Surgery; Future    3. Acute cough  -     benzonatate (TESSALON) 200 MG capsule; Take 1 capsule (200 mg total) by mouth 3 (three) times a day as needed for cough    4. 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 Diagnosed in Urgent Care. She had an xray which showed probable consolidation.She is accompanied by her . She is improving.      Review of Systems   Constitutional:  Negative for chills and fever.   HENT:  Positive for congestion. Negative for sore throat.    Respiratory:  Positive for cough and wheezing. Negative for chest tightness.    Cardiovascular:  Negative for chest pain and palpitations.   Gastrointestinal:  Negative for abdominal pain, constipation, diarrhea and nausea.   Genitourinary:  Negative for difficulty urinating.   Skin: Negative.    Neurological:  Negative for dizziness and headaches.   Psychiatric/Behavioral: Negative.         Current Outpatient Medications on File Prior to Visit   Medication Sig   • albuterol (Proventil HFA) 90 mcg/act inhaler Inhale 2 puffs every 6 (six) hours as needed for wheezing   • DAILY MULTIPLE VITAMINS/IRON PO Take by mouth   • fluticasone (Flovent Diskus) 100 mcg/actuation diskus  "inhaler Inhale 1 puff 2 (two) times a day Rinse mouth after use.   • Melatonin 5 MG CHEW Chew   • promethazine-dextromethorphan (PHENERGAN-DM) 6.25-15 mg/5 mL oral syrup Take 5 mL by mouth 4 (four) times a day as needed for cough   • hydroquinone 4 % cream Apply topically 2 (two) times a day       Objective     /72 (BP Location: Left arm, Patient Position: Sitting, Cuff Size: Standard)   Pulse 71   Temp 97.8 °F (36.6 °C) (Temporal)   Ht 5' 6\" (1.676 m)   Wt 57.6 kg (127 lb)   LMP 04/01/2019 (Exact Date)   SpO2 97%   BMI 20.50 kg/m²     Physical Exam  Vitals and nursing note reviewed.   Constitutional:       General: She is not in acute distress.  HENT:      Head: Normocephalic.      Right Ear: Tympanic membrane normal.      Left Ear: Tympanic membrane normal.   Neck:      Thyroid: No thyromegaly.   Cardiovascular:      Rate and Rhythm: Normal rate and regular rhythm.      Heart sounds: Normal heart sounds.   Pulmonary:      Effort: Pulmonary effort is normal.      Breath sounds: Normal breath sounds.   Lymphadenopathy:      Cervical: No cervical adenopathy.   Skin:     General: Skin is warm and dry.   Neurological:      Mental Status: She is alert and oriented to person, place, and time.   Psychiatric:         Mood and Affect: Mood normal.       Yany Connor,     "

## 2023-12-08 ENCOUNTER — TELEPHONE (OUTPATIENT)
Dept: FAMILY MEDICINE CLINIC | Facility: CLINIC | Age: 58
End: 2023-12-08

## 2023-12-08 ENCOUNTER — TELEPHONE (OUTPATIENT)
Age: 58
End: 2023-12-08

## 2023-12-08 NOTE — TELEPHONE ENCOUNTER
Plastic surgeon referral given 12/7/23 does not have office in [facility]. Patient requesting a closer surgeon.

## 2023-12-08 NOTE — TELEPHONE ENCOUNTER
Pt called to schedule with Plastics but thought there was an office closer to her. Advised the offices we currently have Birdget Berrios, Ynes Dietz and [address]. Pt said she will check with her doctor to see if there is another Plastic Surgery office closer to her, if not she will call us back.

## 2023-12-08 NOTE — TELEPHONE ENCOUNTER
Left message to call the office for Meri surgeon number [phone]. That's the [facility] office.

## 2023-12-11 ENCOUNTER — TELEPHONE (OUTPATIENT)
Dept: FAMILY MEDICINE CLINIC | Facility: CLINIC | Age: 58
End: 2023-12-11

## 2023-12-11 NOTE — TELEPHONE ENCOUNTER
Pt called inquiring about the phone number for a different plastic surgeon. The phone number  Sidneydebora left in the previous encounter is from [facility]. Pt is looking for somewhere closer. I did teams [facility] she did not get back to me yet.

## 2024-01-06 ENCOUNTER — HOSPITAL ENCOUNTER (OUTPATIENT)
Dept: CT IMAGING | Facility: HOSPITAL | Age: 59
Discharge: HOME | End: 2024-01-06
Payer: COMMERCIAL

## 2024-01-06 DIAGNOSIS — R93.89 ABNORMAL CHEST X-RAY: ICD-10-CM

## 2024-01-06 PROCEDURE — G1004 CDSM NDSC: HCPCS

## 2024-01-06 PROCEDURE — 71250 CT THORAX DX C-: CPT

## 2024-01-08 ENCOUNTER — TELEPHONE (OUTPATIENT)
Dept: PLASTIC SURGERY | Facility: CLINIC | Age: 59
End: 2024-01-08

## 2024-01-10 ENCOUNTER — TELEPHONE (OUTPATIENT)
Age: 59
End: 2024-01-10

## 2024-01-10 NOTE — TELEPHONE ENCOUNTER
Patient called back looking for referral to plastics. Referral faxed and I called to confirm. Again no fax received. [facility] would like it sent thru [employer].

## 2024-01-10 NOTE — TELEPHONE ENCOUNTER
Patient called and stated that a referral was to be faxed over to [facility] for her appoint today 1/10/2023. Advised patient that I would fax over for the referral.   The patient has her appointment at 5 pm today.    Please call patient at  and let her know that the referral  was faxed over  to  [facility]. Patient said it is ok to leave a v/m since she will be at work.

## 2024-01-11 ENCOUNTER — TELEPHONE (OUTPATIENT)
Age: 59
End: 2024-01-11

## 2024-01-11 NOTE — TELEPHONE ENCOUNTER
Kalpana from [facility] called asking for a insurance referral be faxed. They have received the medical referral. Please fax to [phone].     Thank you

## 2024-01-11 NOTE — TELEPHONE ENCOUNTER
Patient returned phone call asking if we had sent over the referral again for [facility]. Patient was advised it was sent. Patient asked if we could call to confirm. I called [facility] and spoke with Marianne and confirmed the fax had been received. I had called patient back and advised that it had been received.

## 2024-01-12 ENCOUNTER — TELEPHONE (OUTPATIENT)
Age: 59
End: 2024-01-12

## 2024-01-12 NOTE — TELEPHONE ENCOUNTER
Pt called checking on results of CT.  While speaking with her I stated they did not look like they were back.  I started to say would leave a message and we either got disconnected or she hung up.    FYI - when results come ,She would like a call back   [phone]

## 2024-01-12 NOTE — TELEPHONE ENCOUNTER
Pt called again looking for results.  Please call patient when doctor reads the results on the cat scan.

## 2024-01-16 DIAGNOSIS — R93.89 ABNORMAL CT SCAN: Primary | ICD-10-CM

## 2024-01-16 NOTE — PROGRESS NOTES
E-Consult Consent: Patient aware and consented that a consult is being performed on their behalf to pulmonary. The patient is aware that they may not see the provider face to face, but the provider may review their medical record and give recommendations. The patient may elect to see the provider in person if requested.

## 2024-01-18 ENCOUNTER — E-CONSULT (OUTPATIENT)
Dept: PULMONOLOGY | Facility: CLINIC | Age: 59
End: 2024-01-18

## 2024-01-18 ENCOUNTER — ANNUAL EXAM (OUTPATIENT)
Dept: GYNECOLOGY | Facility: CLINIC | Age: 59
End: 2024-01-18
Payer: COMMERCIAL

## 2024-01-18 VITALS
DIASTOLIC BLOOD PRESSURE: 76 MMHG | HEIGHT: 66 IN | WEIGHT: 126.4 LBS | BODY MASS INDEX: 20.31 KG/M2 | SYSTOLIC BLOOD PRESSURE: 122 MMHG

## 2024-01-18 DIAGNOSIS — R93.89 ABNORMAL CT OF THE CHEST: Primary | ICD-10-CM

## 2024-01-18 DIAGNOSIS — Z01.419 ENCOUNTER FOR ANNUAL ROUTINE GYNECOLOGICAL EXAMINATION: ICD-10-CM

## 2024-01-18 DIAGNOSIS — N84.1 ENDOCERVICAL POLYP: Primary | ICD-10-CM

## 2024-01-18 DIAGNOSIS — Z12.31 ENCOUNTER FOR SCREENING MAMMOGRAM FOR BREAST CANCER: ICD-10-CM

## 2024-01-18 DIAGNOSIS — R92.30 DENSE BREAST TISSUE ON MAMMOGRAM, UNSPECIFIED TYPE: ICD-10-CM

## 2024-01-18 PROCEDURE — 57500 BIOPSY OF CERVIX: CPT | Performed by: OBSTETRICS & GYNECOLOGY

## 2024-01-18 PROCEDURE — 99396 PREV VISIT EST AGE 40-64: CPT | Performed by: OBSTETRICS & GYNECOLOGY

## 2024-01-18 PROCEDURE — 88305 TISSUE EXAM BY PATHOLOGIST: CPT | Performed by: SPECIALIST

## 2024-01-18 NOTE — PROGRESS NOTES
E-Consult  Dorothy Vo 59 y.o. female MRN: [id number]  Encounter Date: 01/18/24      Reason for Consult / Principal Problem: abnormal imaging    Consulting Provider: Osbaldo Syed MD    Requesting Provider: Yany Connor DO     Question:    Abnormal chest CT 1/6/24. Patient seen in [facility] in Nov x 2 with respiratory symptoms. Rx'ed with azithromycin and then levofloxacin - lingular consolidation on CXR. Hx of melanoma. Radiology recommended  CT - shows groundglass densities. Next step?.       I reviewed imaging in PACS  CT Chest 1/6/24  Patchy dense airspace consolidation at the left lung base seen on the prior radiograph not seen but there is patchy ill-defined groundglass densities within the left lower lobe anteriorly. These are nonspecific but may reflect atypical infection or   inflammation.     CXR 11/30/23  Lingular consolidation may represent pneumonia.     ASSESSMENT:  58 year old woman with recent lingula pneumonia   -Recent pneumonia 11/2023  -Abnormal CT Jan 2024    RECOMMENDATIONS:  The CT 1/6/24 shows improvement of the lingula consolidation from 11/30/23 which presumably improved with antibiotics.  I think as long as the patient has clinically improved it is OK to hold off on further testing/evaluation given the patient had a dense consolidation on chest xray 11/30/23 which was not present 1/6/24 on CT.  If the patient has persistent bothersome symptoms then feel free to have her evaluated by pulmonary in the office.    Total time spent 11-20 minutes, >50% of the total time devoted to medical consultative verbal/EMR discussion between providers. Written report will be generated in the EMR..

## 2024-01-18 NOTE — PROGRESS NOTES
Assessment/Plan:    pap is up to date    Endocervical polyp removed    mammogram reviewed with her including breast density.  RX given for March.  Discussed additional screening with ABUS.  This was ordered and she will check on coverage.    Discussed self breast exams    Probable tiny umbilical hernia - this is asymptomatic.  She was not aware of this. She will discuss with Dr Connor.  If it becomes symptomatic, she can see General surgery.    colon cancer screening-negative Cologuard in January 2023    discussed preventive care, regular exercise and a healthy diet      No problem-specific Assessment & Plan notes found for this encounter.       Diagnoses and all orders for this visit:    Endocervical polyp  -     Tissue Exam    Encounter for annual routine gynecological examination    Encounter for screening mammogram for breast cancer  -     Mammo screening bilateral w 3d & cad; Future    Dense breast tissue on mammogram, unspecified type  -     US breast screening bilateral complete (ABUS); Future          Subjective:      Patient ID: Dorothy Vo is a 59 y.o. female.    Patient here for yearly. She has no complaints.    Normal Pap in December 2021  3D mammogram in March showed dense tissue and intermediate risk.  She was recalled for the left breast.  She had calcifications that were benign in appearance and she can return to routine screening.  We had discussed ABUS last year.  Her mother had breast cancer in her 80s        The following portions of the patient's history were reviewed and updated as appropriate: allergies, current medications, past family history, past medical history, past social history, past surgical history, and problem list.    Review of Systems   Constitutional: Negative.    Gastrointestinal: Negative.    Genitourinary: Negative.          Objective:      LMP 04/01/2019 (Exact Date)          Physical Exam  Vitals reviewed.   Constitutional:       Appearance: Normal appearance. She is  well-developed.   Neck:      Thyroid: No thyromegaly.      Trachea: No tracheal deviation.   Cardiovascular:      Rate and Rhythm: Normal rate and regular rhythm.   Pulmonary:      Effort: Pulmonary effort is normal.      Breath sounds: Normal breath sounds.   Chest:   Breasts:     Breasts are symmetrical.      Right: No inverted nipple, mass, nipple discharge, skin change or tenderness.      Left: No inverted nipple, mass, nipple discharge, skin change or tenderness.   Abdominal:      General: There is no distension.      Palpations: Abdomen is soft. There is no mass.      Tenderness: There is no abdominal tenderness.      Hernia: A hernia is present.      Comments: Probable tiny umbilical hernia   Genitourinary:     Labia:         Right: No rash, tenderness, lesion or injury.         Left: No rash, tenderness, lesion or injury.       Vagina: Normal.      Cervix: No cervical motion tenderness, discharge or friability.      Adnexa:         Right: No mass, tenderness or fullness.          Left: No mass, tenderness or fullness.        Rectum: Normal.      Comments: Tiny endocervical polyp removed without difficulty  Neurological:      Mental Status: She is alert.

## 2024-01-19 ENCOUNTER — TELEPHONE (OUTPATIENT)
Dept: FAMILY MEDICINE CLINIC | Facility: CLINIC | Age: 59
End: 2024-01-19

## 2024-01-19 ENCOUNTER — TELEPHONE (OUTPATIENT)
Age: 59
End: 2024-01-19

## 2024-01-19 NOTE — TELEPHONE ENCOUNTER
Patient contacted the office this afternoon looking for an update on the Consult to Pulmonology. Advised that the e-consult for pulmonology was [facility] and a message was sent to Dr. Connor for review. Patient understood and had no further questions at this time. Will wait to hear back from the office on next steps.

## 2024-01-19 NOTE — PROGRESS NOTES
Colposcopy     Date/Time  1/18/2024 5:20 PM     Universal Protocol   Consent: Verbal consent obtained.  Consent given by: patient  Patient identity confirmed: verbally with patient     Performed by  Marti Walters DO   Authorized by  Marti Walters DO     Procedure Details   Procedure: Biopsy of cervix only      Biopsy(s): yes      Specimen to pathology: yes     Post-procedure      Findings: Polyps     Comments       Colposcopy NOT done  Endocervical polyp noted on exam.  Grasped with allis, removed without difficulty.

## 2024-01-22 ENCOUNTER — TELEPHONE (OUTPATIENT)
Dept: GYNECOLOGY | Facility: CLINIC | Age: 59
End: 2024-01-22

## 2024-01-22 NOTE — TELEPHONE ENCOUNTER
Patient called to report she had a tiny spot today. She wondered if this could be from her exam on Thursday and if so, why not until now?

## 2024-01-23 NOTE — TELEPHONE ENCOUNTER
Patient notified via [facility]Pantry with Dr. Walters's response as per our phone conversation yesterday.

## 2024-01-23 NOTE — TELEPHONE ENCOUNTER
Yes, that is possible and not unusual for it to be a few days later.  If it continues, she should let us know.  Pathology showed a benign endocervical polyp as expected.

## 2024-01-25 ENCOUNTER — OFFICE VISIT (OUTPATIENT)
Dept: FAMILY MEDICINE CLINIC | Facility: CLINIC | Age: 59
End: 2024-01-25
Payer: COMMERCIAL

## 2024-01-25 VITALS
WEIGHT: 127.4 LBS | SYSTOLIC BLOOD PRESSURE: 114 MMHG | HEIGHT: 66 IN | OXYGEN SATURATION: 98 % | BODY MASS INDEX: 20.48 KG/M2 | DIASTOLIC BLOOD PRESSURE: 82 MMHG | HEART RATE: 69 BPM | TEMPERATURE: 97.7 F

## 2024-01-25 DIAGNOSIS — H10.33 ACUTE CONJUNCTIVITIS OF BOTH EYES, UNSPECIFIED ACUTE CONJUNCTIVITIS TYPE: Primary | ICD-10-CM

## 2024-01-25 DIAGNOSIS — J06.9 VIRAL URI: ICD-10-CM

## 2024-01-25 LAB
SARS-COV-2 AG UPPER RESP QL IA: NEGATIVE
VALID CONTROL: NORMAL

## 2024-01-25 PROCEDURE — 99213 OFFICE O/P EST LOW 20 MIN: CPT | Performed by: FAMILY MEDICINE

## 2024-01-25 PROCEDURE — 87811 SARS-COV-2 COVID19 W/OPTIC: CPT | Performed by: FAMILY MEDICINE

## 2024-01-25 RX ORDER — FLUTICASONE PROPIONATE 50 MCG
1 SPRAY, SUSPENSION (ML) NASAL DAILY
Qty: 11.1 ML | Refills: 0 | Status: SHIPPED | OUTPATIENT
Start: 2024-01-25

## 2024-01-25 RX ORDER — POLYMYXIN B SULFATE AND TRIMETHOPRIM 1; 10000 MG/ML; [USP'U]/ML
1 SOLUTION OPHTHALMIC EVERY 4 HOURS
Qty: 5 ML | Refills: 0 | Status: SHIPPED | OUTPATIENT
Start: 2024-01-25 | End: 2024-02-01

## 2024-01-25 NOTE — ASSESSMENT & PLAN NOTE
Covid negative; advised on supportive care; advised on OTC symptom relief and antihistamine and nasal steroids; lungs clear on exam and no concerning sx; f/u guidance given

## 2024-01-25 NOTE — PROGRESS NOTES
"Assessment/Plan:     1. Acute conjunctivitis of both eyes, unspecified acute conjunctivitis type  Assessment & Plan:  Maybe be secondary to acute viral illness but given exposure advised tx; f/u guidance given    Orders:  -     polymyxin b-trimethoprim (POLYTRIM) ophthalmic solution; Administer 1 drop to both eyes every 4 (four) hours for 7 days    2. Viral URI  Assessment & Plan:  Covid negative; advised on supportive care; advised on OTC symptom relief and antihistamine and nasal steroids; lungs clear on exam and no concerning sx; f/u guidance given    Orders:  -     fluticasone (FLONASE) 50 mcg/act nasal spray; 1 spray into each nostril daily  -     POCT Rapid Covid Ag          Subjective:      Patient ID: Dorothy Vo is a 59 y.o. female.            Upper Respiratory Infection  Patient complains of symptoms of a URI. Symptoms include congestion, no  fever, post nasal drip, mild cough sore throat, and eye redness and discharge. Onset of symptoms was 1 days ago, and has been unchanged since that time. Treatment to date: decongestants. Noticed discharge from eyes yesterday. States she works at a  and was exposed to pink eye.                       The following portions of the patient's history were reviewed and updated as appropriate: allergies, current medications, past family history, past medical history, past social history, past surgical history, and problem list.    Review of Systems   Constitutional:  Negative for chills and fever.   HENT:  Positive for congestion, postnasal drip and rhinorrhea.    Eyes:  Positive for discharge and redness. Negative for pain, itching and visual disturbance.   Respiratory:  Positive for cough. Negative for chest tightness, shortness of breath and wheezing.          Objective:      /82 (BP Location: Left arm, Patient Position: Sitting, Cuff Size: Standard)   Pulse 69   Temp 97.7 °F (36.5 °C) (Temporal)   Ht 5' 6\" (1.676 m)   Wt 57.8 kg (127 lb 6.4 oz)   " LMP 04/01/2019 (Exact Date)   SpO2 98%   BMI 20.56 kg/m²          Physical Exam  Vitals reviewed.   Constitutional:       General: She is not in acute distress.     Appearance: Normal appearance. She is not ill-appearing, toxic-appearing or diaphoretic.   HENT:      Head: Normocephalic and atraumatic.      Right Ear: Tympanic membrane normal.      Left Ear: Tympanic membrane normal.      Nose: Mucosal edema, congestion and rhinorrhea present.      Right Sinus: No maxillary sinus tenderness or frontal sinus tenderness.      Left Sinus: No maxillary sinus tenderness or frontal sinus tenderness.      Mouth/Throat:      Pharynx: No oropharyngeal exudate or posterior oropharyngeal erythema.   Eyes:      General: Lids are normal. No scleral icterus.        Right eye: No discharge.         Left eye: No discharge.      Extraocular Movements: Extraocular movements intact.      Conjunctiva/sclera:      Right eye: Right conjunctiva is injected. No hemorrhage.     Left eye: Left conjunctiva is injected. No hemorrhage.  Cardiovascular:      Rate and Rhythm: Normal rate and regular rhythm.      Pulses: Normal pulses.      Heart sounds: Normal heart sounds. No murmur heard.     No gallop.   Pulmonary:      Effort: Pulmonary effort is normal. No respiratory distress.      Breath sounds: Normal breath sounds. No stridor. No wheezing, rhonchi or rales.   Musculoskeletal:      Cervical back: Neck supple.      Right lower leg: No edema.      Left lower leg: No edema.   Lymphadenopathy:      Cervical: No cervical adenopathy.   Neurological:      General: No focal deficit present.      Mental Status: She is alert and oriented to person, place, and time.   Psychiatric:         Mood and Affect: Mood normal.         Behavior: Behavior normal.         Thought Content: Thought content normal.         Judgment: Judgment normal.

## 2024-02-02 ENCOUNTER — TELEPHONE (OUTPATIENT)
Age: 59
End: 2024-02-02

## 2024-02-02 ENCOUNTER — NURSE TRIAGE (OUTPATIENT)
Age: 59
End: 2024-02-02

## 2024-02-02 DIAGNOSIS — J01.90 ACUTE NON-RECURRENT SINUSITIS, UNSPECIFIED LOCATION: Primary | ICD-10-CM

## 2024-02-02 RX ORDER — AMOXICILLIN AND CLAVULANATE POTASSIUM 875; 125 MG/1; MG/1
1 TABLET, FILM COATED ORAL EVERY 12 HOURS SCHEDULED
Qty: 14 TABLET | Refills: 0 | Status: SHIPPED | OUTPATIENT
Start: 2024-02-02 | End: 2024-02-09

## 2024-02-02 NOTE — TELEPHONE ENCOUNTER
Symptoms started over 1 week ago. I would probably add oral antibiotics if she is still having sinus congestion. I will send Augmentin to pharmacy. Please clarify it is not just her eyes because if swelling or redness or vision issues would recommend recheck or f/u with eye doctor. Would recommend additional tx if issue is mostly eyes.

## 2024-02-02 NOTE — TELEPHONE ENCOUNTER
Regarding: Pink Eye  ----- Message from Darshana Walter sent at 2/2/2024 12:54 PM EST -----  Patient received call back from PCP office that she should take antibiotic to treat lung irritation but her main concern is pink eye. She would like a call back to discuss how best to treat increased swelling and itching. Recommendation from Dr. Alexis was to follow up with eye doctor but her specialist is closed for the weekend.

## 2024-02-02 NOTE — TELEPHONE ENCOUNTER
"Patient seen in office and treated starting 1/25/24, patient calling back to state that symptoms have not resolved. PCP note relayed to patient, verbalizes understanding. Ordered additional antibiotic, patient states she will  and start today. Patient verbalized will continue to use eye drops prescribed prior and monitor for worsening symptoms.       Reason for Disposition   Red eye and no complications    Answer Assessment - Initial Assessment Questions  1. LOCATION: Location: \"What's red, the eyeball or the outer eyelids?\" (Note: when callers say the eye is red, they usually mean the sclera is red)        B/l eyes  2. REDNESS OF SCLERA: \"Is the redness in one or both eyes?\" \"When did the redness start?\"       B/l, being tx for pink eye, symptoms not getting better.  4. EYELIDS: \"Are the eyelids red or swollen?\" If Yes, ask: \"How much?\"       denies  5. VISION: \"Is there any difficulty seeing clearly?\"       Denies issue  6. ITCHING: \"Does it feel itchy?\" If so ask: \"How bad is it\" (e.g., Scale 1-10; or mild, moderate, severe)      Yes per patient  7. PAIN: \"Is there any pain? If Yes, ask: \"How bad is it?\" (e.g., Scale 1-10; or mild, moderate, severe)      denies  9. CAUSE: \"What do you think is causing the redness?\"      Pink eye    Protocols used: Eye - Red Without Pus-ADULT-[place]    "

## 2024-02-02 NOTE — TELEPHONE ENCOUNTER
"Patient called and asked that we pass a message on to Dr. Connor, \"Pink eye is showing no improvement.  Still using antibacterial drops and maybe its viral because its not responding.  Is there anything else we can do?\"    Patient can be reached at   "

## 2024-02-06 ENCOUNTER — TELEPHONE (OUTPATIENT)
Age: 59
End: 2024-02-06

## 2024-02-06 NOTE — TELEPHONE ENCOUNTER
Ciara from Dr. Ramila Nieto's office, Optometrist needs an insurance referral for today's appt 2/6    DX H10.89  NPI [id number]    Fax [phone]

## 2024-02-21 DIAGNOSIS — J06.9 VIRAL URI: ICD-10-CM

## 2024-02-21 PROBLEM — H10.33 ACUTE CONJUNCTIVITIS OF BOTH EYES: Status: RESOLVED | Noted: 2024-01-25 | Resolved: 2024-02-21

## 2024-02-21 RX ORDER — FLUTICASONE PROPIONATE 50 MCG
SPRAY, SUSPENSION (ML) NASAL
Qty: 16 ML | Refills: 5 | Status: SHIPPED | OUTPATIENT
Start: 2024-02-21

## 2024-05-07 ENCOUNTER — TELEPHONE (OUTPATIENT)
Age: 59
End: 2024-05-07

## 2024-05-07 NOTE — TELEPHONE ENCOUNTER
Patient called in and stated that she needs a referral to [facility] - [address], [place] [address]. Patient has na appointment on 10/15/2024.    Please fax referral to [phone].    If you have any questions please feel free to call the patient back at .    Thank you.

## 2024-05-31 ENCOUNTER — HOSPITAL ENCOUNTER (OUTPATIENT)
Dept: MAMMOGRAPHY | Facility: CLINIC | Age: 59
Discharge: HOME | End: 2024-05-31
Payer: COMMERCIAL

## 2024-05-31 ENCOUNTER — HOSPITAL ENCOUNTER (OUTPATIENT)
Dept: ULTRASOUND IMAGING | Facility: CLINIC | Age: 59
Discharge: HOME | End: 2024-05-31
Payer: COMMERCIAL

## 2024-05-31 VITALS — WEIGHT: 127 LBS | HEIGHT: 66 IN | BODY MASS INDEX: 20.41 KG/M2

## 2024-05-31 DIAGNOSIS — Z12.31 ENCOUNTER FOR SCREENING MAMMOGRAM FOR BREAST CANCER: ICD-10-CM

## 2024-05-31 DIAGNOSIS — R92.30 DENSE BREAST TISSUE ON MAMMOGRAM, UNSPECIFIED TYPE: ICD-10-CM

## 2024-05-31 PROCEDURE — 76641 ULTRASOUND BREAST COMPLETE: CPT

## 2024-05-31 PROCEDURE — 77063 BREAST TOMOSYNTHESIS BI: CPT

## 2024-05-31 PROCEDURE — 77067 SCR MAMMO BI INCL CAD: CPT

## 2025-01-23 ENCOUNTER — ANNUAL EXAM (OUTPATIENT)
Dept: GYNECOLOGY | Facility: CLINIC | Age: 60
End: 2025-01-23
Payer: COMMERCIAL

## 2025-01-23 VITALS
DIASTOLIC BLOOD PRESSURE: 64 MMHG | SYSTOLIC BLOOD PRESSURE: 118 MMHG | HEIGHT: 66 IN | WEIGHT: 126 LBS | BODY MASS INDEX: 20.25 KG/M2

## 2025-01-23 DIAGNOSIS — Z12.39 SCREENING FOR BREAST CANCER USING NON-MAMMOGRAM MODALITY: ICD-10-CM

## 2025-01-23 DIAGNOSIS — K42.9 UMBILICAL HERNIA WITHOUT OBSTRUCTION AND WITHOUT GANGRENE: ICD-10-CM

## 2025-01-23 DIAGNOSIS — Z11.51 SCREENING FOR HPV (HUMAN PAPILLOMAVIRUS): ICD-10-CM

## 2025-01-23 DIAGNOSIS — Z12.31 ENCOUNTER FOR SCREENING MAMMOGRAM FOR BREAST CANCER: ICD-10-CM

## 2025-01-23 DIAGNOSIS — R92.30 DENSE BREAST TISSUE ON MAMMOGRAM, UNSPECIFIED TYPE: ICD-10-CM

## 2025-01-23 DIAGNOSIS — Z01.419 ENCOUNTER FOR ANNUAL ROUTINE GYNECOLOGICAL EXAMINATION: Primary | ICD-10-CM

## 2025-01-23 PROCEDURE — 99396 PREV VISIT EST AGE 40-64: CPT | Performed by: OBSTETRICS & GYNECOLOGY

## 2025-01-23 NOTE — PROGRESS NOTES
Name: Dorothy Vo      : 1965      MRN: [id number]  Encounter Provider: Marti Walters DO  Encounter Date: 2025   Encounter department: [facility]  :  Assessment & Plan  Encounter for annual routine gynecological examination         Encounter for screening mammogram for breast cancer    Orders:    Mammo screening bilateral w 3d and cad; Future    Dense breast tissue on mammogram, unspecified type    Orders:    US breast screening bilateral complete (ABUS); Future    Screening for breast cancer using non-mammogram modality    Orders:    US breast screening bilateral complete (ABUS); Future    Umbilical hernia without obstruction and without gangrene    Orders:    Ambulatory Referral to General Surgery; Future    pap and HPV done today    Dense tissue and elevated risk - mammogram reviewed with her including breast density.  RX given for May.  Discussed additional screening with either breast MRI or ABUS again.  She does not feel she could tolerate MRI.  ABUS was ordered.  Discussed self breast exams    colon cancer screening-negative Cologuard in 2023    Umbilical hernia - this is small and typically asymptomatic.  It is only tender when pressed.  Referral placed to general surgery to discuss possible repair.    discussed preventive care, regular exercise and a healthy diet      History of Present Illness   HPI  Dorothy Vo is a 60 y.o. female who presents for yearly.  She has no gyn complaints.    Normal 3D mammogram in May with dense tissue and elevated risk.  Prior to this she had intermediate risk. She also had a normal automated  breast ultrasound in May  Mother had breast cancer in her 80s    Normal Pap in 2021        Review of Systems   Constitutional: Negative.    Gastrointestinal: Negative.    Genitourinary: Negative.           Objective   LMP 2019 (Exact Date)      Physical Exam  Vitals and nursing note reviewed. Exam conducted with a  chaperone present.   Constitutional:       Appearance: She is well-developed.   HENT:      Head: Normocephalic and atraumatic.   Neck:      Thyroid: No thyromegaly.   Cardiovascular:      Rate and Rhythm: Normal rate and regular rhythm.   Pulmonary:      Effort: Pulmonary effort is normal.      Breath sounds: Normal breath sounds.   Chest:   Breasts:     Right: Normal.      Left: Normal.      Comments: Examined seated and supine  Abdominal:      Palpations: Abdomen is soft.      Hernia: A hernia is present.      Comments: Small umbilical hernia noted, tender with deep palpated, no incarceration   Genitourinary:     General: Normal vulva.      Vagina: Normal.      Cervix: Normal.      Uterus: Normal.       Adnexa: Right adnexa normal and left adnexa normal.      Rectum: Normal.   Musculoskeletal:      Cervical back: Neck supple.   Skin:     General: Skin is warm and dry.   Neurological:      Mental Status: She is alert.   Psychiatric:         Mood and Affect: Mood normal.

## 2025-01-24 ENCOUNTER — TELEPHONE (OUTPATIENT)
Age: 60
End: 2025-01-24

## 2025-01-24 NOTE — TELEPHONE ENCOUNTER
Clementina from Dr Khan's office is requesting an insurance referral for the following specialty:      Test Name / Order Name: Consult and Treat    DX Code: K42.9    Date Of Service: 1/28/25    Location/Facility Name/Address/Phone #: Dr Boyd Khan, [address] [address], [phone]  Fax: [phone]    Location / Facility NPI: [id number]    Best Phone # To Reach The Patient: [phone]

## 2025-01-28 ENCOUNTER — CONSULT (OUTPATIENT)
Dept: SURGERY | Facility: CLINIC | Age: 60
End: 2025-01-28
Payer: COMMERCIAL

## 2025-01-28 ENCOUNTER — RESULTS FOLLOW-UP (OUTPATIENT)
Dept: GYNECOLOGY | Facility: CLINIC | Age: 60
End: 2025-01-28

## 2025-01-28 VITALS
HEIGHT: 66 IN | BODY MASS INDEX: 20.28 KG/M2 | WEIGHT: 126.2 LBS | DIASTOLIC BLOOD PRESSURE: 77 MMHG | SYSTOLIC BLOOD PRESSURE: 123 MMHG | HEART RATE: 60 BPM

## 2025-01-28 DIAGNOSIS — K42.9 UMBILICAL HERNIA WITHOUT OBSTRUCTION AND WITHOUT GANGRENE: ICD-10-CM

## 2025-01-28 LAB
CLINICAL INFO: NORMAL
CYTO CVX: NORMAL
CYTOLOGY CMNT CVX/VAG CYTO-IMP: NORMAL
DATE PREVIOUS BX: NORMAL
HPV E6+E7 MRNA CVX QL NAA+PROBE: NOT DETECTED
LMP START DATE: NORMAL
SL AMB PREV. PAP:: NORMAL
SPECIMEN SOURCE CVX/VAG CYTO: NORMAL

## 2025-01-28 PROCEDURE — 99203 OFFICE O/P NEW LOW 30 MIN: CPT | Performed by: SURGERY

## 2025-01-28 RX ORDER — SODIUM CHLORIDE, SODIUM LACTATE, POTASSIUM CHLORIDE, CALCIUM CHLORIDE 600; 310; 30; 20 MG/100ML; MG/100ML; MG/100ML; MG/100ML
125 INJECTION, SOLUTION INTRAVENOUS CONTINUOUS
OUTPATIENT
Start: 2025-03-24

## 2025-01-28 NOTE — PROGRESS NOTES
What happens to my end of your balance from last year.  Name: Dorothy Vo      : 1965      MRN: [id number]  Encounter Provider: Boyd Khan MD  Encounter Date: 2025   Encounter department: [facility]  :  Assessment & Plan  Umbilical hernia without obstruction and without gangrene  She has a small fat-containing umbilical hernia.  The hernia is symptomatic.  Will plan for open repair at [facility].  The risks benefits alters explained to her she is agreeable to proceed.  The defect is likely 1 cm or less and will unlikely to need to mesh.    Orders:    Ambulatory Referral to General Surgery    Diet NPO; Sips with meds; Standing    Apply Sequential Compression Device; Standing    Place sequential compression device; Standing    Case request operating room: REPAIR HERNIA UMBILICAL; Standing        History of Present Illness   HPI  Dorothy Vo is a 60 y.o. female who presents for evaluation of umbilical hernia.  She saw her gynecologist who pointed out to her.  She states has been aware for for few months now but has been not paying them attention to.  She has some discomfort specially when she leans against things or bumps against it.  She denies nausea vomiting fevers or chills.      Review of Systems   Constitutional:  Negative for appetite change, chills and fever.   HENT:  Negative for congestion and ear pain.    Eyes:  Negative for discharge and itching.   Respiratory:  Negative for chest tightness and shortness of breath.    Cardiovascular:  Negative for chest pain and palpitations.   Gastrointestinal:  Negative for abdominal distention and abdominal pain.   Genitourinary:  Negative for difficulty urinating and frequency.   Musculoskeletal:  Negative for arthralgias and gait problem.   Skin:  Negative for color change and rash.   Neurological:  Negative for dizziness and numbness.   Psychiatric/Behavioral:  Negative for agitation and  "confusion.           Objective   /77 (BP Location: Left arm, Patient Position: Sitting, Cuff Size: Standard)   Pulse 60   Ht 5' 6\" (1.676 m)   Wt 57.2 kg (126 lb 3.2 oz)   LMP 04/01/2019 (Exact Date)   BMI 20.37 kg/m²      Physical Exam  Vitals and nursing note reviewed.   Constitutional:       General: She is not in acute distress.     Appearance: She is well-developed. She is not diaphoretic.   HENT:      Head: Normocephalic and atraumatic.   Eyes:      Pupils: Pupils are equal, round, and reactive to light.   Cardiovascular:      Rate and Rhythm: Normal rate and regular rhythm.   Pulmonary:      Effort: Respiratory distress present.   Abdominal:      General: Bowel sounds are normal.      Palpations: Abdomen is soft.      Comments: Fat-containing umbilical hernia.   Musculoskeletal:         General: Normal range of motion.      Cervical back: Normal range of motion and neck supple.   Skin:     General: Skin is warm and dry.   Neurological:      Mental Status: She is alert and oriented to person, place, and time.   Psychiatric:         Behavior: Behavior normal.           "

## 2025-01-28 NOTE — ASSESSMENT & PLAN NOTE
She has a small fat-containing umbilical hernia.  The hernia is symptomatic.  Will plan for open repair at [facility].  The risks benefits alters explained to her she is agreeable to proceed.  The defect is likely 1 cm or less and will unlikely to need to mesh.    Orders:    Ambulatory Referral to General Surgery    Diet NPO; Sips with meds; Standing    Apply Sequential Compression Device; Standing    Place sequential compression device; Standing    Case request operating room: REPAIR HERNIA UMBILICAL; Standing

## 2025-02-14 ENCOUNTER — TELEPHONE (OUTPATIENT)
Age: 60
End: 2025-02-14

## 2025-02-14 NOTE — TELEPHONE ENCOUNTER
Gifty from [facility] called in regarding the patients [facility] paperwork. They received the paperwork but will need to have it adjusted. She states that Under Part 3: Question 10- Totally or Partially will need to be marked off. Please refax the [facility] paperwork over when this is complete. She can be reached at [phone] if there are any questions or concerns.

## 2025-02-14 NOTE — TELEPHONE ENCOUNTER
"Pt of Dr. Khan with umbilical hernia surgery scheduled 3/24/25-   Pt calling in concerned about if she has more than 1 hernia and if Dr. Khan would be \"checking around\" during the surgery since she did not have any preop imaging.   Pt reports slight discomfort at times on either side of abdomen near umbilical.     Please advise. Call back # [phone]  "

## 2025-02-18 ENCOUNTER — NURSE TRIAGE (OUTPATIENT)
Age: 60
End: 2025-02-18

## 2025-02-18 ENCOUNTER — TELEPHONE (OUTPATIENT)
Dept: SURGERY | Facility: CLINIC | Age: 60
End: 2025-02-18

## 2025-02-18 NOTE — TELEPHONE ENCOUNTER
Pt wants copies of the ppwk recently completed for her records ([place] ppwk & [facility] Attending Physician Statement ppwk). Said her , Ethan, will  Friday a.m. 2/21/25. Envelope ready at  for pickup.

## 2025-02-18 NOTE — TELEPHONE ENCOUNTER
Pt called in stating has discomfort on the hernia area. Pt wants to know if it's normal or not. Warm-transferred to CTS.

## 2025-02-18 NOTE — TELEPHONE ENCOUNTER
"Pt of Dr. Khan with surgery scheduled 3/24/25-    Pt asking if it is normal to have discomfort to the left side of umbilical because she is under the impression pain should only be at bellybutton.   Advised that this is typical, and that a message was routed on 2/14/25 making provider aware of areas of discomfort. Pt did not hear back after this message was sent.     Pt verbalized that she was concerned about the provider \"missing\" the area of where the hernia is on day of operation.     Please advise. Call back # [phone]    "

## 2025-03-03 ENCOUNTER — TELEPHONE (OUTPATIENT)
Age: 60
End: 2025-03-03

## 2025-03-03 NOTE — TELEPHONE ENCOUNTER
Called pt left msg on vm asking to call us back with clarification if she had the surgery yet and her f/u appt is 4/7/25. A referral was done for consult appt so will need for f/u appt in April

## 2025-03-03 NOTE — TELEPHONE ENCOUNTER
Pt has surgery 3.24 and a f/u visit on 4.7. She usually needs insurance referrals so she is requesting one for the 4.7 f/u appt.

## 2025-03-21 ENCOUNTER — ANESTHESIA EVENT (OUTPATIENT)
Dept: PERIOP | Facility: HOSPITAL | Age: 60
End: 2025-03-21
Payer: COMMERCIAL

## 2025-03-22 NOTE — ANESTHESIA PREPROCEDURE EVALUATION
"Procedure:  REPAIR HERNIA UMBILICAL (Abdomen)    Relevant Problems   MUSCULOSKELETAL   (+) Low back pain     No results found for: \"WBC\", \"HGB\", \"HCT\", \"MCV\", \"PLT\"  Past Medical History:   Diagnosis Date    Cancer (HCC) Dec. 19, 2022    Melanoma    Endometriosis     Melanoma (HCC)     Menopause ovarian failure     Varicella          Physical Exam    Airway    Mallampati score: II  TM Distance: >3 FB  Neck ROM: full     Dental   No notable dental hx     Cardiovascular  Rhythm: regular, Rate: normal    Pulmonary   Breath sounds clear to auscultation    Other Findings  Intercisor Distance > 3cm    post-pubertal.      Anesthesia Plan  ASA Score- 2     Anesthesia Type- general with ASA Monitors.         Additional Monitors:     Airway Plan: LMA.    Comment: Discussed benefits/risks of general anesthesia including possibility of mouth/throat pain, injury to lips/teeth, nausea/vomiting, and surgical pain along with more rare complications such as stroke, MI, pneumonia, aspiration, and injury to blood vessels. All questions answered.  .       Plan Factors-Exercise tolerance (METS): >4 METS.    Chart reviewed. EKG reviewed.  Existing labs reviewed.                   Induction- intravenous.    Postoperative Plan- Plan for postoperative opioid use. Planned trial extubation        Informed Consent- Anesthetic plan and risks discussed with patient.  I personally reviewed this patient with the CRNA. Discussed and agreed on the Anesthesia Plan with the CRNA..      NPO Status:  No vitals data found for the desired time range.        "

## 2025-03-24 ENCOUNTER — HOSPITAL ENCOUNTER (OUTPATIENT)
Facility: HOSPITAL | Age: 60
Setting detail: OUTPATIENT SURGERY
Discharge: HOME | End: 2025-03-24
Attending: SURGERY | Admitting: SURGERY
Payer: COMMERCIAL

## 2025-03-24 ENCOUNTER — ANESTHESIA (OUTPATIENT)
Dept: PERIOP | Facility: HOSPITAL | Age: 60
End: 2025-03-24
Payer: COMMERCIAL

## 2025-03-24 VITALS
SYSTOLIC BLOOD PRESSURE: 110 MMHG | OXYGEN SATURATION: 93 % | HEART RATE: 57 BPM | TEMPERATURE: 97.9 F | HEIGHT: 66 IN | WEIGHT: 124.78 LBS | BODY MASS INDEX: 20.05 KG/M2 | RESPIRATION RATE: 12 BRPM | DIASTOLIC BLOOD PRESSURE: 58 MMHG

## 2025-03-24 DIAGNOSIS — K42.9 UMBILICAL HERNIA WITHOUT OBSTRUCTION AND WITHOUT GANGRENE: ICD-10-CM

## 2025-03-24 PROCEDURE — 88302 TISSUE EXAM BY PATHOLOGIST: CPT | Performed by: PATHOLOGY

## 2025-03-24 PROCEDURE — 49591 RPR AA HRN 1ST < 3 CM RDC: CPT | Performed by: SURGERY

## 2025-03-24 PROCEDURE — NC001 PR NO CHARGE: Performed by: SURGERY

## 2025-03-24 RX ORDER — LIDOCAINE HYDROCHLORIDE 20 MG/ML
INJECTION, SOLUTION EPIDURAL; INFILTRATION; INTRACAUDAL; PERINEURAL AS NEEDED
Status: DISCONTINUED | OUTPATIENT
Start: 2025-03-24 | End: 2025-03-24

## 2025-03-24 RX ORDER — PROMETHAZINE HYDROCHLORIDE 25 MG/ML
12.5 INJECTION, SOLUTION INTRAMUSCULAR; INTRAVENOUS ONCE AS NEEDED
Status: DISCONTINUED | OUTPATIENT
Start: 2025-03-24 | End: 2025-03-24 | Stop reason: HOSPADM

## 2025-03-24 RX ORDER — ONDANSETRON 2 MG/ML
4 INJECTION INTRAMUSCULAR; INTRAVENOUS ONCE AS NEEDED
Status: DISCONTINUED | OUTPATIENT
Start: 2025-03-24 | End: 2025-03-24 | Stop reason: HOSPADM

## 2025-03-24 RX ORDER — FENTANYL CITRATE/PF 50 MCG/ML
50 SYRINGE (ML) INJECTION
Status: DISCONTINUED | OUTPATIENT
Start: 2025-03-24 | End: 2025-03-24 | Stop reason: HOSPADM

## 2025-03-24 RX ORDER — PROPOFOL 10 MG/ML
INJECTION, EMULSION INTRAVENOUS AS NEEDED
Status: DISCONTINUED | OUTPATIENT
Start: 2025-03-24 | End: 2025-03-24

## 2025-03-24 RX ORDER — SODIUM CHLORIDE, SODIUM LACTATE, POTASSIUM CHLORIDE, CALCIUM CHLORIDE 600; 310; 30; 20 MG/100ML; MG/100ML; MG/100ML; MG/100ML
125 INJECTION, SOLUTION INTRAVENOUS CONTINUOUS
Status: DISCONTINUED | OUTPATIENT
Start: 2025-03-24 | End: 2025-03-24 | Stop reason: HOSPADM

## 2025-03-24 RX ORDER — HYDROMORPHONE HCL/PF 1 MG/ML
0.5 SYRINGE (ML) INJECTION
Status: DISCONTINUED | OUTPATIENT
Start: 2025-03-24 | End: 2025-03-24 | Stop reason: HOSPADM

## 2025-03-24 RX ORDER — FEXOFENADINE HCL 180 MG/1
180 TABLET ORAL DAILY
COMMUNITY

## 2025-03-24 RX ORDER — MAGNESIUM HYDROXIDE 1200 MG/15ML
LIQUID ORAL AS NEEDED
Status: DISCONTINUED | OUTPATIENT
Start: 2025-03-24 | End: 2025-03-24 | Stop reason: HOSPADM

## 2025-03-24 RX ORDER — MIDAZOLAM HYDROCHLORIDE 2 MG/2ML
INJECTION, SOLUTION INTRAMUSCULAR; INTRAVENOUS AS NEEDED
Status: DISCONTINUED | OUTPATIENT
Start: 2025-03-24 | End: 2025-03-24

## 2025-03-24 RX ORDER — KETOROLAC TROMETHAMINE 30 MG/ML
INJECTION, SOLUTION INTRAMUSCULAR; INTRAVENOUS AS NEEDED
Status: DISCONTINUED | OUTPATIENT
Start: 2025-03-24 | End: 2025-03-24

## 2025-03-24 RX ORDER — DEXAMETHASONE SODIUM PHOSPHATE 10 MG/ML
INJECTION, SOLUTION INTRAMUSCULAR; INTRAVENOUS AS NEEDED
Status: DISCONTINUED | OUTPATIENT
Start: 2025-03-24 | End: 2025-03-24

## 2025-03-24 RX ORDER — IBUPROFEN 400 MG/1
400 TABLET, FILM COATED ORAL ONCE
Status: COMPLETED | OUTPATIENT
Start: 2025-03-24 | End: 2025-03-24

## 2025-03-24 RX ORDER — OXYCODONE HYDROCHLORIDE 5 MG/1
5 TABLET ORAL EVERY 4 HOURS PRN
Status: DISCONTINUED | OUTPATIENT
Start: 2025-03-24 | End: 2025-03-24 | Stop reason: HOSPADM

## 2025-03-24 RX ORDER — PHENYLEPHRINE HCL IN 0.9% NACL 1 MG/10 ML
SYRINGE (ML) INTRAVENOUS AS NEEDED
Status: DISCONTINUED | OUTPATIENT
Start: 2025-03-24 | End: 2025-03-24

## 2025-03-24 RX ORDER — FENTANYL CITRATE 50 UG/ML
INJECTION, SOLUTION INTRAMUSCULAR; INTRAVENOUS AS NEEDED
Status: DISCONTINUED | OUTPATIENT
Start: 2025-03-24 | End: 2025-03-24

## 2025-03-24 RX ORDER — ONDANSETRON 2 MG/ML
INJECTION INTRAMUSCULAR; INTRAVENOUS AS NEEDED
Status: DISCONTINUED | OUTPATIENT
Start: 2025-03-24 | End: 2025-03-24

## 2025-03-24 RX ORDER — BUPIVACAINE HYDROCHLORIDE AND EPINEPHRINE 2.5; 5 MG/ML; UG/ML
INJECTION, SOLUTION EPIDURAL; INFILTRATION; INTRACAUDAL; PERINEURAL AS NEEDED
Status: DISCONTINUED | OUTPATIENT
Start: 2025-03-24 | End: 2025-03-24 | Stop reason: HOSPADM

## 2025-03-24 RX ORDER — OXYCODONE HYDROCHLORIDE 5 MG/1
5 TABLET ORAL EVERY 4 HOURS PRN
Qty: 12 TABLET | Refills: 0 | Status: SHIPPED | OUTPATIENT
Start: 2025-03-24 | End: 2025-04-03

## 2025-03-24 RX ORDER — ACETAMINOPHEN 325 MG/1
975 TABLET ORAL ONCE
Status: COMPLETED | OUTPATIENT
Start: 2025-03-24 | End: 2025-03-24

## 2025-03-24 RX ORDER — EPHEDRINE SULFATE 50 MG/ML
INJECTION INTRAVENOUS AS NEEDED
Status: DISCONTINUED | OUTPATIENT
Start: 2025-03-24 | End: 2025-03-24

## 2025-03-24 RX ORDER — SCOPOLAMINE 1 MG/3D
1 PATCH, EXTENDED RELEASE TRANSDERMAL
Status: DISCONTINUED | OUTPATIENT
Start: 2025-03-24 | End: 2025-03-24 | Stop reason: HOSPADM

## 2025-03-24 RX ORDER — CEFAZOLIN SODIUM 2 G/50ML
2000 SOLUTION INTRAVENOUS ONCE
Status: COMPLETED | OUTPATIENT
Start: 2025-03-24 | End: 2025-03-24

## 2025-03-24 RX ORDER — ACETAMINOPHEN 325 MG/1
650 TABLET ORAL EVERY 6 HOURS PRN
Status: DISCONTINUED | OUTPATIENT
Start: 2025-03-24 | End: 2025-03-24 | Stop reason: HOSPADM

## 2025-03-24 RX ADMIN — PROPOFOL 150 MG: 10 INJECTION, EMULSION INTRAVENOUS at 11:26

## 2025-03-24 RX ADMIN — FENTANYL CITRATE 50 MCG: 50 INJECTION INTRAMUSCULAR; INTRAVENOUS at 11:42

## 2025-03-24 RX ADMIN — ONDANSETRON 4 MG: 2 INJECTION INTRAMUSCULAR; INTRAVENOUS at 11:54

## 2025-03-24 RX ADMIN — Medication 100 MCG: at 11:34

## 2025-03-24 RX ADMIN — EPHEDRINE SULFATE 5 MG: 50 INJECTION INTRAVENOUS at 11:37

## 2025-03-24 RX ADMIN — IBUPROFEN 400 MG: 400 TABLET, FILM COATED ORAL at 13:47

## 2025-03-24 RX ADMIN — SCOPOLAMINE 1 PATCH: 1.5 PATCH, EXTENDED RELEASE TRANSDERMAL at 10:12

## 2025-03-24 RX ADMIN — SODIUM CHLORIDE, SODIUM LACTATE, POTASSIUM CHLORIDE, AND CALCIUM CHLORIDE 125 ML/HR: .6; .31; .03; .02 INJECTION, SOLUTION INTRAVENOUS at 10:06

## 2025-03-24 RX ADMIN — CEFAZOLIN SODIUM 2000 MG: 2 SOLUTION INTRAVENOUS at 11:20

## 2025-03-24 RX ADMIN — MIDAZOLAM HYDROCHLORIDE 2 MG: 1 INJECTION, SOLUTION INTRAMUSCULAR; INTRAVENOUS at 11:20

## 2025-03-24 RX ADMIN — FENTANYL CITRATE 25 MCG: 50 INJECTION INTRAMUSCULAR; INTRAVENOUS at 11:26

## 2025-03-24 RX ADMIN — EPHEDRINE SULFATE 5 MG: 50 INJECTION INTRAVENOUS at 11:46

## 2025-03-24 RX ADMIN — PROPOFOL 70 MCG/KG/MIN: 10 INJECTION, EMULSION INTRAVENOUS at 11:28

## 2025-03-24 RX ADMIN — LIDOCAINE HYDROCHLORIDE 60 MG: 20 INJECTION, SOLUTION EPIDURAL; INFILTRATION; INTRACAUDAL at 11:26

## 2025-03-24 RX ADMIN — KETOROLAC TROMETHAMINE 15 MG: 30 INJECTION, SOLUTION INTRAMUSCULAR; INTRAVENOUS at 12:01

## 2025-03-24 RX ADMIN — SODIUM CHLORIDE, SODIUM LACTATE, POTASSIUM CHLORIDE, AND CALCIUM CHLORIDE: .6; .31; .03; .02 INJECTION, SOLUTION INTRAVENOUS at 11:15

## 2025-03-24 RX ADMIN — PROPOFOL 20 MG: 10 INJECTION, EMULSION INTRAVENOUS at 11:42

## 2025-03-24 RX ADMIN — ACETAMINOPHEN 975 MG: 325 TABLET, FILM COATED ORAL at 09:50

## 2025-03-24 RX ADMIN — DEXAMETHASONE SODIUM PHOSPHATE 10 MG: 10 INJECTION INTRAMUSCULAR; INTRAVENOUS at 11:28

## 2025-03-24 NOTE — H&P
History & Physical    Dorothy Vo    60 y.o.  female  [id number]  Surgeon:Boyd Khan MD  Date: March 24, 2025    Assessment:  Patient Active Problem List   Diagnosis    Blepharitis    Blepharitis of right upper eyelid    Dizziness    Low back pain    Dense breast tissue    Lentigo maligna (melanoma in situ) of cheek (HCC)    Umbilical hernia without obstruction and without gangrene     Plan:  Dorothy Vo is scheduled for umbilical hernia repair.    HPI    Historical Information   Past Medical History:   Diagnosis Date    Cancer (HCC) Dec. 19, 2022    Melanoma    Endometriosis     Melanoma (HCC)     Menopause ovarian failure     Varicella      Past Surgical History:   Procedure Laterality Date    BREAST BIOPSY Left 2010    HERNIA REPAIR      TONSILLECTOMY       Social History   Social History     Substance and Sexual Activity   Alcohol Use No     Social History     Substance and Sexual Activity   Drug Use No     Social History     Tobacco Use   Smoking Status Never   Smokeless Tobacco Never     Family History   Problem Relation Age of Onset    Breast cancer Mother 75    Prostate cancer Father     No Known Problems Sister     No Known Problems Daughter     No Known Problems Maternal Grandmother     No Known Problems Maternal Grandfather     No Known Problems Paternal Grandmother     No Known Problems Paternal Grandfather     No Known Problems Brother     No Known Problems Brother     No Known Problems Brother     No Known Problems Son     No Known Problems Maternal Aunt     No Known Problems Maternal Aunt     Colon cancer Neg Hx     Endometrial cancer Neg Hx     Ovarian cancer Neg Hx         Meds/Allergies   Allergies   Allergen Reactions    Adhesive  [Medical Tape]        Current Facility-Administered Medications:     ceFAZolin (ANCEF) IVPB (premix in dextrose) 2,000 mg 50 mL, 2,000 mg, Intravenous, Once, Boyd Khan MD    lactated ringers infusion, 125 mL/hr, Intravenous, Continuous, Jan  "Caprez, DO    lactated ringers infusion, 125 mL/hr, Intravenous, Continuous, Boyd Khan MD, Last Rate: 125 mL/hr at 03/24/25 1006, 125 mL/hr at 03/24/25 1006    scopolamine (TRANSDERM-SCOP) 1 mg/3 days TD 72 hr patch 1 patch, 1 patch, Transdermal, Q72H, Bright Kayli Black MD, 1 patch at 03/24/25 1012    Review of Systems    Vitals:    03/24/25 0951   BP: 124/67   Pulse: 61   Resp: 20   Temp: 97.7 °F (36.5 °C)   SpO2: 94%     Physical Exam  GEN: NAD, A+OX3   HEENT: Normocephalic, atraumatic,   NECK: Supple, trachea midline,   CARDIAC: regular rate & rhythm, S1 & S2 normal.   LUNGS: Clear to auscultation, No Wheeze, Rales, or Rhonchi  ABDOMEN: Small umbilical hernia EXTREMITIES: No evidence of cyanosis, clubbing or edema. Pulses +2 B/L LE  NEURO: CN II-XII intact grossly, No sensory or motor deficits    Lab Results:   Imaging: EKG, Pathology, and Other Studies:   No results found for: \"GLUCOSE\", \"CALCIUM\", \"NA\", \"K\", \"CO2\", \"CL\", \"BUN\", \"CREATININE\"  No results found for: \"WBC\", \"HGB\", \"HCT\", \"MCV\", \"PLT\"  No results found for: \"ALT\", \"AST\", \"GGT\", \"ALKPHOS\", \"BILITOT\"          "

## 2025-03-24 NOTE — OP NOTE
OPERATIVE REPORT  PATIENT NAME: Dorothy Vo    :  1965  MRN: [id number]  Pt Location: [facility] OR ROOM 03    SURGERY DATE: 3/24/2025    Surgeons and Role:     * Boyd Khan MD - Primary     * Boyd Estrada MD - Assisting     * Agus Cabral DPM - Assisting    Preop Diagnosis:  Umbilical hernia without obstruction and without gangrene [K42.9]    Post-Op Diagnosis Codes:     * Umbilical hernia without obstruction and without gangrene [K42.9]    Procedure(s):  REPAIR HERNIA UMBILICAL    Specimen(s):  ID Type Source Tests Collected by Time Destination   1 : Umblical hernia sac Tissue Hernia Sac, Umbilical TISSUE EXAM Boyd Khan MD 3/24/2025 1145        Estimated Blood Loss:   Minimal    Drains:  * No LDAs found *    Anesthesia Type:   General    Operative Indications:  Umbilical hernia without obstruction and without gangrene [K42.9]      Operative Findings:    Prior to opening the fascia, or reducing the contents of the hernia, the hernia defect was measured. The defect measured 1 cm by 1 cm. This was repaired as described in the body of the report below.      Complications:   None    Procedure and Technique:  The patient was seen in the Holding Room. The risks, benefits, complications, treatment options, and expected outcomes were discussed with the patient. The possibilities of reaction to medication, pulmonary aspiration, perforation of viscus, bleeding, recurrent infection, the need for additional procedures, failure to diagnose a condition, and creating a complication requiring transfusion or operation were discussed with the patient. The patient concurred with the proposed plan, giving informed consent.  The site of surgery properly noted/marked. The patient was taken to Operating Room, identified as Dorothy Vo and staff verified patient name, , and procedure.   A Time Out was held and the above information confirmed.    The patient was placed supine.  After establishing general  anesthesia, the abdomen was prepped and draped in standard fashion. Local anesthesia was used at the incision. An infraumbilical incision was made.  Dissection was carried down to the hernia sac located above the fascia and was mobilized from surrounding structures.  Preperitoneal fat and hernia sac was excised and sent to pathology.  The defect was quite small and deemed inappropriate for mesh placement.  Intact fascia was identified circumferentially around the defect. Adhesions anterior and posterior to the fascia were lysed using cautery and/or blunt dissection. The Fascia was closed with interrupted 2-0 Prolene sutures.  The umbilicus was re-created using 3-0 Vicryl sutures.The soft tissue was irrigated and closed in layers,using Vicryl sutures.  Hemostasis was confirmed.  The skin incision was closed in layers with a 4-0 Monocryl subcuticular closure.  Histocryl glue was used.  Instrument, sponge, and needle counts were correct prior to closure and at the conclusion of the case.     This text is generated with voice recognition software. There may be translation, syntax,  or grammatical errors. If you have any questions, please contact the dictating provider.   .     I was present for the entire procedure.    Patient Disposition:  PACU              SIGNATURE: Boyd Khan MD  DATE: March 24, 2025  TIME: 11:54 AM

## 2025-03-24 NOTE — DISCHARGE INSTR - AVS FIRST PAGE
[facility]’s General Surgery [facility]     Post-Operative Care Instructions     Dr. Boyd Khan MD, [facility]     [phone]          1. General: You will feel pulling sensations around the wound or funny aches and pains around the incisions. This is normal. Even minor surgery is a change in your body and this is your body’s way of reacting to it. If you have had abdominal surgery, it may help to support the incision with a small pillow or blanket for comfort when moving or coughing.     2. Wound care:  Okay to shower.  The glue will fall off over the next week or 2.   Use ice for at least the 1st 48 hours.  Do not use for longer than 20 minutes at a time. Use 3 times per day.     3. Water: You may shower over the wounds. Do not bathe or use a pool or hot tub until cleared by the physician.   If you were discharged with a drain, make sure drain site is covered with plastic wrap before showering.      4. Activity: You may go up and down stairs, walk as much as you are comfortable, but walk at least 3 times each day. If you have had abdominal surgery, do not lift anything heavier than 20 pounds for at least 4 weeks.      5. Diet: You may resume a regular diet. If you had a same-day surgery or overnight stay surgery, you may wish to eat lightly for a few days: soups, crackers, and sandwiches. You may resume a regular diet when ready.      6. Medications: Resume all of your previous medications, unless told otherwise by the doctor. Avoid aspirin products for 2-3 days after the date of surgery. You may, at that time, began to take them again. Use Tylenol and Ibuprofen for pain control.  You may alternate these medications every 3 hours.  For example: you may take Tylenol at noon, Ibuprofen at 3:00 p.m., and Tylenol again at 6:00 p.m., etc. You should use ice to assist with pain control as above.  You do not need to take narcotic pain medication unless you are having significant pain.   If you were prescribed a  narcotic pain medication containing Tylenol, such as Percocet or Norco, do not use supplemental Tylenol.      7. Driving: You will need someone to drive you home on the day of surgery or discharge. Do not drive or make any important decisions while on narcotic pain medication or 24 hours and after anesthesia or sedation for surgery. Generally, you may drive when your off all narcotic pain medications and you are comfortable.      8. Upset Stomach: You may take Maalox, Tums, or similar items for an upset stomach. If your narcotic pain medication causes an upset stomach, do not take it on an empty stomach. Try taking it with at least some crackers or toast.      9. Constipation: Patients often experience constipation after surgery. You may take over-the-counter medication for this, such as Metamucil, Senokot, Dulcolax, milk of magnesia, etc. You may take a suppository unless you have had anorectal surgery such as a procedure on your hemorrhoids. If you experience significant nausea or vomiting after abdominal surgery, call the office before trying any of these medications.     10. Call the office: If you are experiencing any of the following: fevers above 101.5°, significant nausea or vomiting, if the wound develops drainage and/or there is excessive redness around the wound, or if you have significant diarrhea or other worsening symptoms.     11. Pain: You may be given a prescription for pain medication.  This will be sent to your pharmacy prior to discharge.     12. Sexual Activity: You may resume sexual activity when you feel ready and comfortable and your incision is sealed and healed without apparent infection risk.     13. Urination: If you have not urinated in 6 hours, go directly to the ER for evaluation for urinary retention.      14. Follow-up in 2 weeks.          **READ ONLY IF YOU HAVE BEEN DISCHARGED WITH A URINARY CATHETER**    Espinal Insertion for Post-Op Urinary Retention        - A prescription for  Flomax will be sent to your pharmacy.  This should be taken daily while the urinary catheter remains in place.    You will not be given a prescription for Flomax if your prostate has been removed.  If you are already taking Flomax, continue the medication as prescribed.     - We will send a message to the urology group who will contact you within the next 48 hours with further instructions and to schedule an appointment for voiding trial and catheter removal.  The urinary catheter will remain in place for approximately 1 week.  If you are not contacted within the next 48 hours please call our office to assist with scheduling your follow-up.     - If you have your own urologist, you should contact your physician the day after discharge for instructions and to schedule a voiding trial and catheter removal.

## 2025-03-25 NOTE — ANESTHESIA POSTPROCEDURE EVALUATION
Post-Op Assessment Note    CV Status:  Stable    Pain management: adequate       Mental Status:  Awake   Hydration Status:  Euvolemic   PONV Controlled:  Controlled   Airway Patency:  Patent     Post Op Vitals Reviewed: Yes    No anethesia notable event occurred.    Staff: Anesthesiologist           Last Filed PACU Vitals:  Vitals Value Taken Time   Temp 98.5 °F (36.9 °C) 03/24/25 1240   Pulse 66 03/24/25 1243   /55 03/24/25 1240   Resp 14 03/24/25 1240   SpO2 94 % 03/24/25 1243   Vitals shown include unfiled device data.    Modified Boogie:     Vitals Value Taken Time   Activity 2 03/24/25 1240   Respiration 2 03/24/25 1240   Circulation 2 03/24/25 1240   Consciousness 1 03/24/25 1240   Oxygen Saturation 2 03/24/25 1240     Modified Boogie Score: 9

## 2025-04-03 NOTE — PROGRESS NOTES
Assessment/Plan:   Dorothy Vo is a 60 y.o.female who comes in today for postoperative check after an open umbilical hernia repair 03/24/2025 with Dr. Khan    Pathology: Reviewed with patient, all questions answered.   Final Diagnosis   A. Umbilical hernia sac, repair:  -Mesothelial-lined adipose tissue, consistent with hernia sac.       Patient is very pleased with the outcome of their surgery.   Postoperative restrictions reviewed, including specific lifting and exercise restrictions. All questions answered. Work note provided.    __________________________________________________  HPI:  Dorothy Vo is a 60 y.o.female who comes in today for postoperative check after recent open umbilical hernia repair 03/24/2025 with Dr. Kahn    Currently doing well without problems, no fever or chills,no nausea and no vomiting. No chest pain or trouble breathing.       ROS:  General ROS: negative for - chills, fatigue, fever or night sweats, weight loss  Respiratory ROS: no cough, shortness of breath, or wheezing  Cardiovascular ROS: no chest pain or dyspnea on exertion  Genito-Urinary ROS: no dysuria, trouble voiding, or hematuria  Musculoskeletal ROS: negative for - gait disturbance, joint pain or muscle pain  Neurological ROS: no TIA or stroke symptoms  GI ROS: see HPI  Skin ROS: no new rashes or lesions   Lymphatic ROS: no new adenopathy noted by pt.   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 19, 2022    Melanoma    Endometriosis     Melanoma (HCC)     Menopause ovarian failure     Varicella        Past Surgical History:   Procedure Laterality Date    BREAST BIOPSY Left 2010    HERNIA REPAIR      IN RPR AA HERNIA 1ST < 3 CM REDUCIBLE N/A 3/24/2025    Procedure: REPAIR HERNIA UMBILICAL;  Surgeon: Boyd Khan MD;  Location: [facility];  Service: General    TONSILLECTOMY         Family History   Problem Relation Age of Onset    Breast cancer Mother 75    Prostate cancer Father     No Known Problems Sister     No Known Problems Daughter     No Known Problems Maternal Grandmother     No Known Problems Maternal Grandfather     No Known Problems Paternal Grandmother     No Known Problems Paternal Grandfather     No Known Problems Brother     No Known Problems Brother     No Known Problems Brother     No Known Problems Son     No Known Problems Maternal Aunt     No Known Problems Maternal Aunt     Colon cancer Neg Hx     Endometrial cancer Neg Hx     Ovarian cancer Neg Hx         reports that she has never smoked. She has never used smokeless tobacco. She reports that she does not drink alcohol and does not use drugs.    There were no vitals filed for this visit.    PHYSICAL EXAM  General: normal, cooperative, no distress  Abdominal: soft, nondistended, or nontender  Incision: clean, dry, and intact and healing well      LUDA Elias    Date: 4/3/2025 Time: 1:20 PM

## 2025-04-04 ENCOUNTER — TELEPHONE (OUTPATIENT)
Age: 60
End: 2025-04-04

## 2025-04-04 DIAGNOSIS — L57.0 ACTINIC KERATOSES: ICD-10-CM

## 2025-04-04 DIAGNOSIS — L82.0 INFLAMED SEBORRHEIC KERATOSIS: Primary | ICD-10-CM

## 2025-04-04 NOTE — TELEPHONE ENCOUNTER
Please place doc to doc order for dermatology.     Pt was already seen 4/01/25 at [facility] for Inflamed seborrheic keratosis and actinic damage.    Once order is placed by pcp, PLEASE forward to [facility] auth pool [id number]) to complete back dated ins referral. (Script needed first)    Patient is requesting an insurance referral for the following specialty:      Test Name / Order Name: [id number]    DX Code: L57.8, D48.5    Date Of Service: 04/01/25    Location/Facility Name/Address/Phone #:   [facility]  [address]  [address]  [facility] [address]  Phone [phone]  Fax [phone]    Location / Facility NPI: [id number]    Best Phone # To Reach The Patient:  [phone]   Facility can pull off [employer] portals

## 2025-04-07 ENCOUNTER — OFFICE VISIT (OUTPATIENT)
Dept: SURGERY | Facility: CLINIC | Age: 60
End: 2025-04-07
Payer: COMMERCIAL

## 2025-04-07 VITALS
OXYGEN SATURATION: 99 % | HEART RATE: 69 BPM | DIASTOLIC BLOOD PRESSURE: 76 MMHG | RESPIRATION RATE: 16 BRPM | HEIGHT: 66 IN | BODY MASS INDEX: 20.22 KG/M2 | SYSTOLIC BLOOD PRESSURE: 118 MMHG | WEIGHT: 125.8 LBS | TEMPERATURE: 96.7 F

## 2025-04-07 DIAGNOSIS — Z09 S/P UMBILICAL HERNIA REPAIR, FOLLOW-UP EXAM: Primary | ICD-10-CM

## 2025-04-07 PROCEDURE — 99212 OFFICE O/P EST SF 10 MIN: CPT

## 2025-04-07 NOTE — LETTER
April 7, 2025     Patient: Dorothy Vo  YOB: 1965  Date of Visit: 4/7/2025      To Whom it May Concern:    Dorothy Vo is under my professional care. Dorothy was seen in my office on 4/7/2025. Dorothy may return to work on 04/21/2025 .    If you have any questions or concerns, please don't hesitate to call.         Sincerely,          LUDA Elias        CC: No Recipients

## 2025-04-14 ENCOUNTER — OFFICE VISIT (OUTPATIENT)
Dept: SURGERY | Facility: CLINIC | Age: 60
End: 2025-04-14
Payer: COMMERCIAL

## 2025-04-14 ENCOUNTER — TELEPHONE (OUTPATIENT)
Age: 60
End: 2025-04-14

## 2025-04-14 VITALS
HEIGHT: 66 IN | OXYGEN SATURATION: 97 % | RESPIRATION RATE: 16 BRPM | DIASTOLIC BLOOD PRESSURE: 84 MMHG | WEIGHT: 125 LBS | SYSTOLIC BLOOD PRESSURE: 142 MMHG | HEART RATE: 63 BPM | BODY MASS INDEX: 20.09 KG/M2 | TEMPERATURE: 97.5 F

## 2025-04-14 DIAGNOSIS — Z09 S/P UMBILICAL HERNIA REPAIR, FOLLOW-UP EXAM: Primary | ICD-10-CM

## 2025-04-14 PROCEDURE — 99212 OFFICE O/P EST SF 10 MIN: CPT

## 2025-04-14 NOTE — TELEPHONE ENCOUNTER
Clementina from [facility] stated that she needs an insurance referral for patient asap since the patient made an appt for today at 9:30 am. Her doctor is Boyd Khan, but he will be out for 2 weeks so the patient was seen by LUDA Elias.. There seems to be an insurance referral with Dr Khan. Referral number [id number]. This is for a hernia visit. NPI: [id number]. For more information, please contact Clementina Del Toro (patient  practice operations) at [facility] through Secure chat or teams. She had sent  a message to Precious for the referral, but she was in a conference call. Thank you.

## 2025-04-14 NOTE — PROGRESS NOTES
Assessment/Plan:   Dorothy Vo is a 60 y.o.female who comes in today for postoperative check after an open umbilical hernia repair 03/24/2025 with Dr. Khan    Patient has a small stitch sticking out of right corner of incision. Healing nicely. Clean dry intact. Stitch end was removed and tolerated.    Postoperative restrictions reviewed, including specific lifting and exercise restrictions. All questions answered.     __________________________________________________  HPI:  Dorothy Vo is a 60 y.o.female who comes in today for postoperative check after recent open umbilical hernia repair 03/24/2025 with Dr. Khan    Currently doing well without problems, no fever or chills,no nausea and no vomiting. No chest pain or trouble breathing. Comes in today with reports of stitch sticking out of umbilical incision. Slightly itchy at site.      ROS:  General ROS: negative for - chills, fatigue, fever or night sweats, weight loss  Respiratory ROS: no cough, shortness of breath, or wheezing  Cardiovascular ROS: no chest pain or dyspnea on exertion  Genito-Urinary ROS: no dysuria, trouble voiding, or hematuria  Musculoskeletal ROS: negative for - gait disturbance, joint pain or muscle pain  Neurological ROS: no TIA or stroke symptoms  GI ROS: see HPI  Skin ROS: no new rashes or lesions   Lymphatic ROS: no new adenopathy noted by pt.   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 19, 2022    Melanoma    Endometriosis     Melanoma (HCC)     Menopause ovarian failure     Varicella        Past Surgical History:   Procedure Laterality Date    BREAST BIOPSY Left 2010    HERNIA REPAIR      [place] RPR AA HERNIA 1ST < 3 CM REDUCIBLE N/A 3/24/2025    Procedure: REPAIR HERNIA UMBILICAL;  Surgeon: Boyd Khan MD;  Location: [facility];  Service: General    TONSILLECTOMY         Family History   Problem Relation Age of Onset    Breast cancer Mother 75    Prostate cancer Father     No Known Problems Sister     No Known Problems Daughter     No Known Problems Maternal Grandmother     No Known Problems Maternal Grandfather     No Known Problems Paternal Grandmother     No Known Problems Paternal Grandfather     No Known Problems Brother     No Known Problems Brother     No Known Problems Brother     No Known Problems Son     No Known Problems Maternal Aunt     No Known Problems Maternal Aunt     Colon cancer Neg Hx     Endometrial cancer Neg Hx     Ovarian cancer Neg Hx         reports that she has never smoked. She has never used smokeless tobacco. She reports that she does not drink alcohol and does not use drugs.    Vitals:    04/14/25 0936   BP: 142/84   Pulse: 63   Resp: 16   Temp: 97.5 °F (36.4 °C)   SpO2: 97%       PHYSICAL EXAM  General: normal, cooperative, no distress  Abdominal: soft, nondistended, or nontender  Incision: clean, dry, and intact and healing well. Stitch sticking out of umbilical site. Stitch end removed.       LUDA Elias    Date: 4/14/2025 Time: 9:38 AM

## 2025-07-10 ENCOUNTER — HOSPITAL ENCOUNTER (OUTPATIENT)
Dept: RADIOLOGY | Age: 60
Discharge: HOME | End: 2025-07-10
Payer: COMMERCIAL

## 2025-07-10 VITALS — HEIGHT: 66 IN | WEIGHT: 125 LBS | BODY MASS INDEX: 20.09 KG/M2

## 2025-07-10 DIAGNOSIS — Z12.31 ENCOUNTER FOR SCREENING MAMMOGRAM FOR BREAST CANCER: ICD-10-CM

## 2025-07-10 DIAGNOSIS — Z12.39 SCREENING FOR BREAST CANCER USING NON-MAMMOGRAM MODALITY: ICD-10-CM

## 2025-07-10 DIAGNOSIS — R92.30 DENSE BREAST TISSUE ON MAMMOGRAM, UNSPECIFIED TYPE: ICD-10-CM

## 2025-07-10 PROCEDURE — 77063 BREAST TOMOSYNTHESIS BI: CPT

## 2025-07-10 PROCEDURE — 76641 ULTRASOUND BREAST COMPLETE: CPT

## 2025-07-10 PROCEDURE — 77067 SCR MAMMO BI INCL CAD: CPT

## (undated) DEVICE — BINDER ABDOMINAL 30-45 IN

## (undated) DEVICE — PLUMEPEN PRO 10FT

## (undated) DEVICE — GLOVE SRG BIOGEL ECLIPSE 7.5

## (undated) DEVICE — SUT MONOCRYL 4-0 PS-2 27 IN Y426H

## (undated) DEVICE — SUT PROLENE 2-0 MO-6 30 IN 8417H

## (undated) DEVICE — SUT VICRYL 0 CT-1 36 IN J946H

## (undated) DEVICE — LAPAROTOMY DRAPE WITH POUCHES: Brand: CONVERTORS

## (undated) DEVICE — 2000CC GUARDIAN II: Brand: GUARDIAN

## (undated) DEVICE — BETHLEHEM UNIVERSAL MINOR GEN: Brand: CARDINAL HEALTH

## (undated) DEVICE — GLOVE INDICATOR PI UNDERGLOVE SZ 8 BLUE

## (undated) DEVICE — ANTIBACTERIAL UNDYED BRAIDED (POLYGLACTIN 910), SYNTHETIC ABSORBABLE SUTURE: Brand: COATED VICRYL

## (undated) DEVICE — SCD SEQUENTIAL COMPRESSION COMFORT SLEEVE MEDIUM KNEE LENGTH: Brand: KENDALL SCD

## (undated) DEVICE — EXOFIN PRECISION PEN HIGH VISCOSITY TOPICAL SKIN ADHESIVE: Brand: EXOFIN PRECISION PEN, 1G